# Patient Record
Sex: FEMALE | Race: WHITE | NOT HISPANIC OR LATINO | Employment: OTHER | URBAN - METROPOLITAN AREA
[De-identification: names, ages, dates, MRNs, and addresses within clinical notes are randomized per-mention and may not be internally consistent; named-entity substitution may affect disease eponyms.]

---

## 2017-01-12 ENCOUNTER — OFFICE VISIT (OUTPATIENT)
Dept: FAMILY MEDICINE CLINIC | Facility: CLINIC | Age: 67
End: 2017-01-12

## 2017-01-12 VITALS
HEIGHT: 62 IN | BODY MASS INDEX: 40.76 KG/M2 | HEART RATE: 63 BPM | DIASTOLIC BLOOD PRESSURE: 70 MMHG | SYSTOLIC BLOOD PRESSURE: 130 MMHG | TEMPERATURE: 98.3 F | WEIGHT: 221.5 LBS | OXYGEN SATURATION: 98 %

## 2017-01-12 DIAGNOSIS — J01.00 ACUTE MAXILLARY SINUSITIS, RECURRENCE NOT SPECIFIED: Primary | ICD-10-CM

## 2017-01-12 PROCEDURE — 99213 OFFICE O/P EST LOW 20 MIN: CPT | Performed by: FAMILY MEDICINE

## 2017-01-12 RX ORDER — AZITHROMYCIN 250 MG/1
TABLET, FILM COATED ORAL
Qty: 6 TABLET | Refills: 0 | Status: SHIPPED | OUTPATIENT
Start: 2017-01-12 | End: 2017-02-20

## 2017-01-12 RX ORDER — DEXTROMETHORPHAN HYDROBROMIDE AND PROMETHAZINE HYDROCHLORIDE 15; 6.25 MG/5ML; MG/5ML
5 SYRUP ORAL 4 TIMES DAILY PRN
Qty: 180 ML | Refills: 0 | Status: SHIPPED | OUTPATIENT
Start: 2017-01-12 | End: 2017-05-01

## 2017-01-12 NOTE — MR AVS SNAPSHOT
Melody Guzman   1/12/2017 4:00 PM   Office Visit    Dept Phone:  [phone]   Encounter #:  [id number]    Provider:  Ceci Estes MD   Department:  [facility] PRIMARY CARE                Your Full Care Plan              Today's Medication Changes          These changes are accurate as of: 1/12/17  3:55 PM.  If you have any questions, ask your nurse or doctor.               New Medication(s)Ordered:     azithromycin 250 MG tablet   Commonly known as:  ZITHROMAX Z-JONAS   Take 2 tablets the first day, then 1 tablet daily for 4 days.   Started by:  Ceci Estes MD       promethazine-dextromethorphan 6.25-15 MG/5ML syrup   Commonly known as:  PROMETHAZINE-DM   Take 5 mL by mouth 4 (Four) Times a Day As Needed for cough.   Started by:  Ceci Estes MD            Where to Get Your Medications      [facility] [address] - [phone]  - [phone] [address], [place] [address]     Phone:  [phone]     azithromycin 250 MG tablet    promethazine-dextromethorphan 6.25-15 MG/5ML syrup                  Your Updated Medication List          This list is accurate as of: 1/12/17  3:55 PM.  Always use your most recent med list.                atenolol 50 MG tablet   Commonly known as:  TENORMIN   Take 1 tablet by mouth daily.       atorvastatin 20 MG tablet   Commonly known as:  LIPITOR   Take 1 tablet by mouth daily.       azithromycin 250 MG tablet   Commonly known as:  ZITHROMAX Z-JONAS   Take 2 tablets the first day, then 1 tablet daily for 4 days.       hydrOXYzine 25 MG tablet   Commonly known as:  ATARAX   Take 1 tablet by mouth 3 (three) times a day as needed for itching.       indomethacin 50 MG capsule   Commonly known as:  INDOCIN   Take 1 capsule by mouth 2 (two) times a day with meals.       ketoconazole 2 % cream   Commonly known as:  NIZORAL   Apply   topically 2 (two) times a day.       meclizine 25 MG tablet   Commonly known as:  ANTIVERT   Take 1 tablet by mouth 3 (three) times a day as needed for dizziness.       promethazine-dextromethorphan 6.25-15 MG/5ML syrup   Commonly known as:  PROMETHAZINE-DM   Take 5 mL by mouth 4 (Four) Times a Day As Needed for cough.       triamcinolone 0.025 % cream   Commonly known as:  KENALOG   Apply  topically 3 (three) times a day.               You Were Diagnosed With        Codes Comments    Acute maxillary sinusitis, recurrence not specified    -  Primary ICD-10-CM: J01.00  ICD-9-CM: 461.0       Instructions     None    Patient Instructions History      Upcoming Appointments     Visit Type Date Time Department    OFFICE VISIT 2017  4:00 PM [facility] Signup     [facility] [employer] allows you to send messages to your doctor, view your test results, renew your prescriptions, schedule appointments, and more. To sign up, go to [employer] and click on the Sign Up Now link in the New User? box. Enter your [employer] Activation Code exactly as it appears below along with the last four digits of your Social Security Number and your Date of Birth () to complete the sign-up process. If you do not sign up before the expiration date, you must request a new code.    [employer] Activation Code: [email or web address]  Expires: 2017  3:55 PM    If you have questions, you can email [email or web address] or call [phone] to talk to our [employer] staff. Remember, [employer] is NOT to be used for urgent needs. For medical emergencies, dial 911.               Other Info from Your Visit           Your Appointments     2017  4:00 PM EST   Office Visit with Ceci Estes MD   [facility] (--)    [address]  [facility] [address] [phone]           Arrive 15 minutes prior to appointment.              Allergies     Levofloxacin    "   Nitrofurantoin      Sulfa Antibiotics      Sulfamethoxazole-trimethoprim      Trimox [Amoxicillin]  Parasthesia      Reason for Visit     URI           Vital Signs     Blood Pressure Pulse Temperature Height Weight Last Menstrual Period    130/70 63 98.3 °F (36.8 °C) 62\" (157.5 cm) 221 lb 8 oz (100 kg) (LMP Unknown)    Oxygen Saturation Body Mass Index Smoking Status             98% 40.51 kg/m2 Never Smoker         Problems and Diagnoses Noted     Acute maxillary sinusitis    -  Primary        "

## 2017-01-12 NOTE — PROGRESS NOTES
Subjective   Melody Guzman is a 66 y.o. female. CC: sinus    History of Present Illness  Melody is a 66 year old female who comes in today with the complaint of not feeling well since Tuesday.  She complains of coughing and sinus pressure.  She has nasal drainage which is brownish-green in color.  Her left ear also hurts.  She has been very tired.  She has tried Delsym for the cough but it has not helped her very much.      The following portions of the patient's history were reviewed and updated as appropriate: allergies, current medications, past medical history, past social history, past surgical history and problem list.    Review of Systems   Constitutional: Positive for fatigue. Negative for chills, diaphoresis and fever.   HENT: Positive for congestion, ear pain, rhinorrhea and sinus pressure.    Eyes: Negative.  Negative for visual disturbance.   Respiratory: Positive for cough. Negative for shortness of breath and wheezing.    Cardiovascular: Negative.  Negative for chest pain, palpitations and leg swelling.   Gastrointestinal: Negative.  Negative for abdominal pain, blood in stool, constipation, diarrhea, nausea and vomiting.   Genitourinary: Negative.  Negative for difficulty urinating.   Musculoskeletal: Negative.  Negative for arthralgias and back pain.   Skin: Negative for rash.   Neurological: Negative.  Negative for dizziness, facial asymmetry and headaches.   Psychiatric/Behavioral: Negative for dysphoric mood and sleep disturbance. The patient is not hyperactive.        Objective   Physical Exam   Constitutional: She appears well-developed and well-nourished.   HENT:   Head: Normocephalic and atraumatic.   Right Ear: Hearing, tympanic membrane, external ear and ear canal normal.   Left Ear: Hearing, tympanic membrane, external ear and ear canal normal.   Nose: Nose normal.   Mouth/Throat: Uvula is midline, oropharynx is clear and moist and mucous membranes are normal. No oropharyngeal exudate.    Neck: Normal range of motion. Neck supple.   Cardiovascular: Normal rate, regular rhythm and normal heart sounds.    No murmur heard.  Pulmonary/Chest: Effort normal and breath sounds normal. No respiratory distress.   Lymphadenopathy:     She has no cervical adenopathy.   Skin: Skin is warm and dry. No rash noted.   Psychiatric: She has a normal mood and affect. Her behavior is normal.   Nursing note and vitals reviewed.    Vitals:    01/12/17 1536   BP: 130/70   Pulse: 63   Temp: 98.3 °F (36.8 °C)   SpO2: 98%     Assessment/Plan   Problems Addressed this Visit     None      Visit Diagnoses     Acute maxillary sinusitis, recurrence not specified    -  Primary    Relevant Medications    azithromycin (ZITHROMAX Z-JONAS) 250 MG tablet    promethazine-dextromethorphan (PROMETHAZINE-DM) 6.25-15 MG/5ML syrup

## 2017-02-20 ENCOUNTER — OFFICE VISIT (OUTPATIENT)
Dept: FAMILY MEDICINE CLINIC | Facility: CLINIC | Age: 67
End: 2017-02-20

## 2017-02-20 VITALS
HEART RATE: 58 BPM | DIASTOLIC BLOOD PRESSURE: 65 MMHG | OXYGEN SATURATION: 98 % | TEMPERATURE: 97.9 F | WEIGHT: 218.8 LBS | BODY MASS INDEX: 40.27 KG/M2 | HEIGHT: 62 IN | SYSTOLIC BLOOD PRESSURE: 120 MMHG

## 2017-02-20 DIAGNOSIS — R19.7 DIARRHEA, UNSPECIFIED TYPE: Primary | ICD-10-CM

## 2017-02-20 DIAGNOSIS — R10.84 GENERALIZED ABDOMINAL PAIN: ICD-10-CM

## 2017-02-20 PROCEDURE — 99213 OFFICE O/P EST LOW 20 MIN: CPT | Performed by: FAMILY MEDICINE

## 2017-02-20 NOTE — PROGRESS NOTES
Subjective   Melody Guzman is a 66 y.o. female. CC: abdominal pain    History of Present Illness  Melody is a 66 year old female who comes in today because of abdominal pain which is primarily in the epigastric area.  She also has diarrhea.  She states that in January she took a Z pack for URI.  9 days later her dermatologist gave her Doxycycline 100 mg a day for 5 days.  One week ago she bought a layered salad at [employer].  She took about 3 bites of it and noticed that it tasted strange so she stopped eating it.  Since last Thursday she has felt bloated and has had watery diarrhea.  No blood in it.  Has multiple episodes a day.  Has had some nausea but no vomiting.  Has been afraid to eat because she doesn't want to make it any worse.  Has pain in epigastric area as well as the left side of her abdomen.  No problems on the right side.  No known fever.  No  symptoms.  Has taken Pepto Bismol and it did help a little.      The following portions of the patient's history were reviewed and updated as appropriate: allergies, current medications, past medical history, past social history, past surgical history and problem list.    Review of Systems   Constitutional: Negative for chills, diaphoresis, fatigue and fever.   HENT: Negative.  Negative for congestion.    Respiratory: Negative.  Negative for cough, shortness of breath and wheezing.    Cardiovascular: Negative.  Negative for chest pain, palpitations and leg swelling.   Gastrointestinal: Positive for abdominal distention, abdominal pain, diarrhea and nausea. Negative for blood in stool, constipation, rectal pain and vomiting.   Genitourinary: Negative.  Negative for difficulty urinating, dysuria and hematuria.   Musculoskeletal: Negative.  Negative for arthralgias and back pain.   Skin: Negative.  Negative for rash.   Neurological: Negative.  Negative for dizziness, light-headedness and headaches.   Psychiatric/Behavioral: Negative.  Negative for dysphoric mood  and sleep disturbance. The patient is not nervous/anxious.        Objective   Physical Exam   Constitutional: She is oriented to person, place, and time. She appears well-developed and well-nourished.   Neck: Normal range of motion. Neck supple.   Cardiovascular: Normal rate, regular rhythm and normal heart sounds.    No murmur heard.  Pulmonary/Chest: Effort normal and breath sounds normal. No respiratory distress. She has no wheezes. She has no rales.   Abdominal: Soft. She exhibits no distension and no mass. There is tenderness (epigastric and left side of the abdomen). No hernia.   Neurological: She is alert and oriented to person, place, and time.   Skin: Skin is warm and dry. No rash noted.   Psychiatric: She has a normal mood and affect. Her behavior is normal.   Nursing note and vitals reviewed.    Vitals:    02/20/17 1534   BP: 120/65   Pulse: 58   Temp: 97.9 °F (36.6 °C)   SpO2: 98%     Assessment/Plan   Problems Addressed this Visit     None      Visit Diagnoses     Diarrhea, unspecified type    -  Primary    Relevant Orders    Stool Culture    Clostridium Difficile Toxin    Generalized abdominal pain        Relevant Orders    Comprehensive metabolic panel    CBC & Differential

## 2017-02-21 ENCOUNTER — CLINICAL SUPPORT (OUTPATIENT)
Dept: FAMILY MEDICINE CLINIC | Facility: CLINIC | Age: 67
End: 2017-02-21

## 2017-02-21 DIAGNOSIS — R19.7 DIARRHEA, UNSPECIFIED TYPE: Primary | ICD-10-CM

## 2017-02-21 DIAGNOSIS — R19.7 DIARRHEA, UNSPECIFIED TYPE: ICD-10-CM

## 2017-02-21 LAB
ALBUMIN SERPL-MCNC: 4.7 G/DL (ref 3.5–5.2)
ALBUMIN/GLOB SERPL: 1.7 G/DL
ALP SERPL-CCNC: 61 U/L (ref 39–117)
ALT SERPL-CCNC: 13 U/L (ref 1–33)
AST SERPL-CCNC: 21 U/L (ref 1–32)
BASOPHILS # BLD AUTO: 0.02 10*3/MM3 (ref 0–0.2)
BASOPHILS NFR BLD AUTO: 0.2 % (ref 0–1.5)
BILIRUB SERPL-MCNC: 0.5 MG/DL (ref 0.1–1.2)
BUN SERPL-MCNC: 12 MG/DL (ref 8–23)
BUN/CREAT SERPL: 13.6 (ref 7–25)
CALCIUM SERPL-MCNC: 9.6 MG/DL (ref 8.6–10.5)
CHLORIDE SERPL-SCNC: 100 MMOL/L (ref 98–107)
CO2 SERPL-SCNC: 25.4 MMOL/L (ref 22–29)
CREAT SERPL-MCNC: 0.88 MG/DL (ref 0.57–1)
EOSINOPHIL # BLD AUTO: 0.15 10*3/MM3 (ref 0–0.7)
EOSINOPHIL NFR BLD AUTO: 1.7 % (ref 0.3–6.2)
ERYTHROCYTE [DISTWIDTH] IN BLOOD BY AUTOMATED COUNT: 13 % (ref 11.7–13)
EXPIRATION DATE: NORMAL
GASTROCULT GAST QL: NEGATIVE
GLOBULIN SER CALC-MCNC: 2.8 GM/DL
GLUCOSE SERPL-MCNC: 99 MG/DL (ref 65–99)
HCT VFR BLD AUTO: 41.1 % (ref 35.6–45.5)
HGB BLD-MCNC: 13.7 G/DL (ref 11.9–15.5)
IMM GRANULOCYTES # BLD: 0 10*3/MM3 (ref 0–0.03)
IMM GRANULOCYTES NFR BLD: 0 % (ref 0–0.5)
LYMPHOCYTES # BLD AUTO: 3.28 10*3/MM3 (ref 0.9–4.8)
LYMPHOCYTES NFR BLD AUTO: 38 % (ref 19.6–45.3)
Lab: NORMAL
MCH RBC QN AUTO: 28.6 PG (ref 26.9–32)
MCHC RBC AUTO-ENTMCNC: 33.3 G/DL (ref 32.4–36.3)
MCV RBC AUTO: 85.8 FL (ref 80.5–98.2)
MONOCYTES # BLD AUTO: 0.56 10*3/MM3 (ref 0.2–1.2)
MONOCYTES NFR BLD AUTO: 6.5 % (ref 5–12)
NEUTROPHILS # BLD AUTO: 4.62 10*3/MM3 (ref 1.9–8.1)
NEUTROPHILS NFR BLD AUTO: 53.6 % (ref 42.7–76)
PLATELET # BLD AUTO: 276 10*3/MM3 (ref 140–500)
POTASSIUM SERPL-SCNC: 4.4 MMOL/L (ref 3.5–5.2)
PROT SERPL-MCNC: 7.5 G/DL (ref 6–8.5)
RBC # BLD AUTO: 4.79 10*6/MM3 (ref 3.9–5.2)
SODIUM SERPL-SCNC: 140 MMOL/L (ref 136–145)
WBC # BLD AUTO: 8.63 10*3/MM3 (ref 4.5–10.7)

## 2017-02-21 PROCEDURE — 82274 ASSAY TEST FOR BLOOD FECAL: CPT | Performed by: FAMILY MEDICINE

## 2017-02-22 LAB — C DIFF TOX GENS STL QL NAA+PROBE: NEGATIVE

## 2017-02-25 LAB
BACTERIA SPEC CULT: NORMAL
BACTERIA SPEC CULT: NORMAL
CAMPYLOBACTER STL CULT: NORMAL
E COLI SXT STL QL IA: NEGATIVE
SALM + SHIG STL CULT: NORMAL

## 2017-04-14 ENCOUNTER — TELEPHONE (OUTPATIENT)
Dept: FAMILY MEDICINE CLINIC | Facility: CLINIC | Age: 67
End: 2017-04-14

## 2017-04-14 NOTE — TELEPHONE ENCOUNTER
Unfortunately can see her on Tuesday only if there is a cancellation.  Can try gynecologist or [facility]

## 2017-05-01 ENCOUNTER — OFFICE VISIT (OUTPATIENT)
Dept: FAMILY MEDICINE CLINIC | Facility: CLINIC | Age: 67
End: 2017-05-01

## 2017-05-01 VITALS
SYSTOLIC BLOOD PRESSURE: 130 MMHG | OXYGEN SATURATION: 96 % | DIASTOLIC BLOOD PRESSURE: 80 MMHG | WEIGHT: 221 LBS | TEMPERATURE: 97.9 F | HEIGHT: 62 IN | HEART RATE: 57 BPM | BODY MASS INDEX: 40.67 KG/M2

## 2017-05-01 DIAGNOSIS — N89.8 VAGINAL DISCHARGE: Primary | ICD-10-CM

## 2017-05-01 DIAGNOSIS — N89.8 VAGINAL IRRITATION: ICD-10-CM

## 2017-05-01 DIAGNOSIS — N34.1 NONSPECIFIC URETHRITIS: ICD-10-CM

## 2017-05-01 PROCEDURE — 99213 OFFICE O/P EST LOW 20 MIN: CPT | Performed by: FAMILY MEDICINE

## 2017-05-03 LAB
A VAGINAE DNA VAG QL NAA+PROBE: NORMAL SCORE
BVAB2 DNA VAG QL NAA+PROBE: NORMAL SCORE
C ALBICANS DNA VAG QL NAA+PROBE: NEGATIVE
C GLABRATA DNA VAG QL NAA+PROBE: NEGATIVE
MEGA1 DNA VAG QL NAA+PROBE: NORMAL SCORE
T VAGINALIS RRNA SPEC QL NAA+PROBE: NEGATIVE

## 2017-05-23 DIAGNOSIS — I10 ESSENTIAL HYPERTENSION: ICD-10-CM

## 2017-05-23 RX ORDER — ATENOLOL 50 MG/1
TABLET ORAL
Qty: 30 TABLET | Refills: 5 | Status: SHIPPED | OUTPATIENT
Start: 2017-05-23 | End: 2017-11-28 | Stop reason: SDUPTHER

## 2017-09-08 ENCOUNTER — TRANSCRIBE ORDERS (OUTPATIENT)
Dept: ADMINISTRATIVE | Facility: HOSPITAL | Age: 67
End: 2017-09-08

## 2017-09-08 DIAGNOSIS — Z12.31 VISIT FOR SCREENING MAMMOGRAM: Primary | ICD-10-CM

## 2017-09-13 ENCOUNTER — TRANSCRIBE ORDERS (OUTPATIENT)
Dept: ADMINISTRATIVE | Facility: HOSPITAL | Age: 67
End: 2017-09-13

## 2017-09-13 DIAGNOSIS — Z12.31 VISIT FOR SCREENING MAMMOGRAM: Primary | ICD-10-CM

## 2017-09-14 ENCOUNTER — OFFICE VISIT (OUTPATIENT)
Dept: FAMILY MEDICINE CLINIC | Facility: CLINIC | Age: 67
End: 2017-09-14

## 2017-09-14 VITALS
WEIGHT: 218.6 LBS | TEMPERATURE: 98 F | OXYGEN SATURATION: 97 % | DIASTOLIC BLOOD PRESSURE: 80 MMHG | HEART RATE: 56 BPM | BODY MASS INDEX: 40.23 KG/M2 | HEIGHT: 62 IN | SYSTOLIC BLOOD PRESSURE: 122 MMHG

## 2017-09-14 DIAGNOSIS — B37.31 YEAST VAGINITIS: Primary | ICD-10-CM

## 2017-09-14 DIAGNOSIS — L30.8 OTHER ECZEMA: ICD-10-CM

## 2017-09-14 DIAGNOSIS — R42 DIZZINESS: ICD-10-CM

## 2017-09-14 DIAGNOSIS — I10 ESSENTIAL HYPERTENSION: ICD-10-CM

## 2017-09-14 DIAGNOSIS — B37.2 YEAST DERMATITIS: ICD-10-CM

## 2017-09-14 PROCEDURE — 99213 OFFICE O/P EST LOW 20 MIN: CPT | Performed by: FAMILY MEDICINE

## 2017-09-14 RX ORDER — TRIAMCINOLONE ACETONIDE 0.25 MG/G
CREAM TOPICAL 3 TIMES DAILY
Qty: 30 G | Refills: 11 | Status: SHIPPED | OUTPATIENT
Start: 2017-09-14 | End: 2018-10-15 | Stop reason: SDUPTHER

## 2017-09-14 RX ORDER — FLUCONAZOLE 100 MG/1
TABLET ORAL
Qty: 10 TABLET | Refills: 5 | Status: SHIPPED | OUTPATIENT
Start: 2017-09-14 | End: 2018-03-15

## 2017-09-14 RX ORDER — KETOCONAZOLE 20 MG/G
CREAM TOPICAL 2 TIMES DAILY
Qty: 30 G | Refills: 11 | Status: SHIPPED | OUTPATIENT
Start: 2017-09-14 | End: 2018-10-15 | Stop reason: SDUPTHER

## 2017-09-14 RX ORDER — MECLIZINE HYDROCHLORIDE 25 MG/1
25 TABLET ORAL 3 TIMES DAILY PRN
Qty: 60 TABLET | Refills: 5 | Status: SHIPPED | OUTPATIENT
Start: 2017-09-14 | End: 2019-03-06 | Stop reason: SDUPTHER

## 2017-09-14 NOTE — PROGRESS NOTES
Subjective   Melody Guzman is a 67 y.o. female. CC: hypertension    History of Present Illness   Melody is a 67 year old female who comes in today for her blood pressure check.  She states that her blood pressure has been good.  She feels fine.  No headaches or dizziness, no chest pain.  She complains of a vaginal yeast infection with itching and discharge.  Requests Diflucan for this.  Also needs refills of 2 creams that she uses for dermatologic problems.  She has intermittent bouts of dizziness and requests a refill of Meclizine.        The following portions of the patient's history were reviewed and updated as appropriate: allergies, current medications, past medical history, past social history, past surgical history and problem list.    Review of Systems   Constitutional: Negative.  Negative for fatigue and unexpected weight change.   HENT: Negative.  Negative for congestion and ear pain.    Respiratory: Negative.  Negative for cough, shortness of breath and wheezing.    Cardiovascular: Negative.  Negative for chest pain, palpitations and leg swelling.   Gastrointestinal: Negative.  Negative for abdominal pain, blood in stool, constipation, diarrhea and nausea.   Genitourinary: Positive for vaginal discharge. Negative for difficulty urinating.        Vaginal itching   Musculoskeletal: Negative.  Negative for arthralgias and back pain.   Skin: Negative.  Negative for rash.   Neurological: Negative.  Negative for dizziness, light-headedness and headaches.   Psychiatric/Behavioral: Negative.  Negative for dysphoric mood and sleep disturbance. The patient is not nervous/anxious.        Objective   Physical Exam   Constitutional: She is oriented to person, place, and time. She appears well-developed and well-nourished.   Neck: Normal range of motion. Neck supple.   Cardiovascular: Normal rate, regular rhythm and normal heart sounds.    No murmur heard.  Pulmonary/Chest: Effort normal and breath sounds normal.  No respiratory distress.   Neurological: She is alert and oriented to person, place, and time.   Skin: Skin is warm and dry. No rash noted.   Psychiatric: She has a normal mood and affect. Her behavior is normal.   Nursing note and vitals reviewed.    Vitals:    09/14/17 1053   BP: 122/80   Pulse: 56   Temp: 98 °F (36.7 °C)   SpO2: 97%     Assessment/Plan   Problems Addressed this Visit        Cardiovascular and Mediastinum    Hypertension      Other Visit Diagnoses     Yeast vaginitis    -  Primary    Relevant Medications    fluconazole (DIFLUCAN) 100 MG tablet    Dizziness        Relevant Medications    meclizine (ANTIVERT) 25 MG tablet    Other eczema        Relevant Medications    triamcinolone (KENALOG) 0.025 % cream    ketoconazole (NIZORAL) 2 % cream    Yeast dermatitis        Relevant Medications    triamcinolone (KENALOG) 0.025 % cream    ketoconazole (NIZORAL) 2 % cream    fluconazole (DIFLUCAN) 100 MG tablet        Flu shots not available at the office as yet.

## 2017-09-26 ENCOUNTER — FLU SHOT (OUTPATIENT)
Dept: FAMILY MEDICINE CLINIC | Facility: CLINIC | Age: 67
End: 2017-09-26

## 2017-09-26 PROCEDURE — 90686 IIV4 VACC NO PRSV 0.5 ML IM: CPT | Performed by: FAMILY MEDICINE

## 2017-09-26 PROCEDURE — G0008 ADMIN INFLUENZA VIRUS VAC: HCPCS | Performed by: FAMILY MEDICINE

## 2017-10-06 ENCOUNTER — TELEPHONE (OUTPATIENT)
Dept: FAMILY MEDICINE CLINIC | Facility: CLINIC | Age: 67
End: 2017-10-06

## 2017-10-06 DIAGNOSIS — N30.90 BLADDER INFECTION: Primary | ICD-10-CM

## 2017-10-06 RX ORDER — CIPROFLOXACIN 500 MG/1
500 TABLET, FILM COATED ORAL 2 TIMES DAILY
Qty: 20 TABLET | Refills: 0 | Status: SHIPPED | OUTPATIENT
Start: 2017-10-06 | End: 2017-11-16

## 2017-10-06 NOTE — TELEPHONE ENCOUNTER
Pt called and said that she has a bladder infection. She has urine frequency and lower back pain. She has had a lot of these and knows the symptoms and was wondering if we can call something in?

## 2017-10-06 NOTE — TELEPHONE ENCOUNTER
Looks like she is allergic to all antibiotics normally used for UTI  Please call her---what can she take?  Zpack won't help   What happened when she took Trimox?

## 2017-10-20 ENCOUNTER — HOSPITAL ENCOUNTER (OUTPATIENT)
Dept: MAMMOGRAPHY | Facility: HOSPITAL | Age: 67
Discharge: HOME | End: 2017-10-20
Admitting: FAMILY MEDICINE

## 2017-10-20 DIAGNOSIS — Z12.31 VISIT FOR SCREENING MAMMOGRAM: ICD-10-CM

## 2017-10-20 PROCEDURE — G0202 SCR MAMMO BI INCL CAD: HCPCS

## 2017-11-16 ENCOUNTER — OFFICE VISIT (OUTPATIENT)
Dept: FAMILY MEDICINE CLINIC | Facility: CLINIC | Age: 67
End: 2017-11-16

## 2017-11-16 VITALS
HEIGHT: 62 IN | BODY MASS INDEX: 39.8 KG/M2 | OXYGEN SATURATION: 99 % | TEMPERATURE: 97.9 F | DIASTOLIC BLOOD PRESSURE: 60 MMHG | WEIGHT: 216.3 LBS | SYSTOLIC BLOOD PRESSURE: 118 MMHG | HEART RATE: 89 BPM

## 2017-11-16 DIAGNOSIS — N76.0 ACUTE VAGINITIS: Primary | ICD-10-CM

## 2017-11-16 DIAGNOSIS — R21 RASH OF GENITAL AREA: ICD-10-CM

## 2017-11-16 PROCEDURE — 99213 OFFICE O/P EST LOW 20 MIN: CPT | Performed by: FAMILY MEDICINE

## 2017-11-16 NOTE — PROGRESS NOTES
Subjective   Melody Guzman is a 67 y.o. female. CC: vaginitis    History of Present Illness   Melody is a 67 year old female who comes in today for a possible vaginal infection.  She states that this started several days ago.  She has noted a rash in the genital and labial areas.  Also on her buttocks.  She does have a slight vaginal discharge.  There is no odor to it.  Just notices a small amount on her underwear and the toilet paper.  She has used A & D ointment on the irritated external areas.        The following portions of the patient's history were reviewed and updated as appropriate: allergies, current medications, past family history, past medical history, past social history, past surgical history and problem list.    Review of Systems   Constitutional: Negative.  Negative for fatigue and unexpected weight change.   HENT: Negative.  Negative for congestion.    Respiratory: Negative.  Negative for cough, shortness of breath and wheezing.    Cardiovascular: Negative.  Negative for chest pain and leg swelling.   Gastrointestinal: Negative.  Negative for abdominal pain, diarrhea and nausea.   Genitourinary: Positive for vaginal discharge. Negative for difficulty urinating and hematuria.   Musculoskeletal: Negative.  Negative for arthralgias and back pain.   Skin: Negative.  Negative for rash.   Neurological: Negative.  Negative for dizziness, light-headedness and headaches.   Psychiatric/Behavioral: Negative.  Negative for dysphoric mood and sleep disturbance. The patient is not nervous/anxious.        Objective   Physical Exam   Constitutional: She is oriented to person, place, and time. She appears well-developed and well-nourished.   Genitourinary: Vaginal discharge (white, thin discharge) found.   Genitourinary Comments: Redness inside the labia and the vaginal mucosa   Neurological: She is alert and oriented to person, place, and time.   Skin: Skin is warm and dry. Rash (erythematous rash in the groin  "bilaterally.  also in the rectal area) noted.   Psychiatric: She has a normal mood and affect. Her behavior is normal.   Nursing note and vitals reviewed.    Vitals:    11/16/17 1420   BP: 118/60   Pulse: 89   Temp: 97.9 °F (36.6 °C)   SpO2: 99%   Weight: 216 lb 4.8 oz (98.1 kg)   Height: 62\" (157.5 cm)     Assessment/Plan   Problems Addressed this Visit     None      Visit Diagnoses     Acute vaginitis    -  Primary    Relevant Orders    NuSwab Vaginitis (VG) - Swab, Vagina    Rash of genital area                     "

## 2017-11-28 DIAGNOSIS — I10 ESSENTIAL HYPERTENSION: ICD-10-CM

## 2017-11-29 RX ORDER — ATENOLOL 50 MG/1
TABLET ORAL
Qty: 30 TABLET | Refills: 0 | Status: SHIPPED | OUTPATIENT
Start: 2017-11-29 | End: 2017-12-05 | Stop reason: SDUPTHER

## 2017-12-01 ENCOUNTER — TELEPHONE (OUTPATIENT)
Dept: FAMILY MEDICINE CLINIC | Facility: CLINIC | Age: 67
End: 2017-12-01

## 2017-12-01 NOTE — TELEPHONE ENCOUNTER
GUNNER HERNANDEZ WANTS TO BE SEEN BY YOU FOR HER BLOOD PRESSURE. CAN YOU WORK HER IN? [phone] OR [phone] CELL LEAVE MESSAGE. THANK YOU!

## 2017-12-03 NOTE — TELEPHONE ENCOUNTER
See previous message.  Please call her and see if this is for routine blood pressure check or is she having problems?

## 2017-12-05 ENCOUNTER — OFFICE VISIT (OUTPATIENT)
Dept: FAMILY MEDICINE CLINIC | Facility: CLINIC | Age: 67
End: 2017-12-05

## 2017-12-05 VITALS
SYSTOLIC BLOOD PRESSURE: 130 MMHG | DIASTOLIC BLOOD PRESSURE: 80 MMHG | HEART RATE: 60 BPM | TEMPERATURE: 98.2 F | BODY MASS INDEX: 39.47 KG/M2 | OXYGEN SATURATION: 98 % | WEIGHT: 214.5 LBS | HEIGHT: 62 IN

## 2017-12-05 DIAGNOSIS — R53.81 MALAISE AND FATIGUE: ICD-10-CM

## 2017-12-05 DIAGNOSIS — R53.83 MALAISE AND FATIGUE: ICD-10-CM

## 2017-12-05 DIAGNOSIS — I10 ESSENTIAL HYPERTENSION: ICD-10-CM

## 2017-12-05 DIAGNOSIS — J30.89 CHRONIC NON-SEASONAL ALLERGIC RHINITIS, UNSPECIFIED TRIGGER: ICD-10-CM

## 2017-12-05 DIAGNOSIS — R07.9 CHEST PAIN, UNSPECIFIED TYPE: Primary | ICD-10-CM

## 2017-12-05 LAB
EXPIRATION DATE: NORMAL
FLUAV AG NPH QL: NORMAL
FLUBV AG NPH QL: NORMAL
INTERNAL CONTROL: NORMAL
Lab: NORMAL

## 2017-12-05 PROCEDURE — 87804 INFLUENZA ASSAY W/OPTIC: CPT | Performed by: FAMILY MEDICINE

## 2017-12-05 PROCEDURE — 99213 OFFICE O/P EST LOW 20 MIN: CPT | Performed by: FAMILY MEDICINE

## 2017-12-05 PROCEDURE — 93000 ELECTROCARDIOGRAM COMPLETE: CPT | Performed by: FAMILY MEDICINE

## 2017-12-05 RX ORDER — ATENOLOL 50 MG/1
50 TABLET ORAL DAILY
Qty: 30 TABLET | Refills: 11 | Status: SHIPPED | OUTPATIENT
Start: 2017-12-05 | End: 2018-12-27 | Stop reason: SDUPTHER

## 2017-12-05 RX ORDER — OLOPATADINE HYDROCHLORIDE 1 MG/ML
1 SOLUTION/ DROPS OPHTHALMIC 2 TIMES DAILY
Qty: 1 EACH | Refills: 12 | Status: SHIPPED | OUTPATIENT
Start: 2017-12-05 | End: 2019-03-06 | Stop reason: SDUPTHER

## 2017-12-05 NOTE — PROGRESS NOTES
Procedure     ECG 12 Lead  Date/Time: 12/5/2017 2:25 PM  Performed by: ЕКАТЕРИНА MOSES  Authorized by: ЕКАТЕРИНА MOSES   Comparison: not compared with previous ECG   Previous ECG: no previous ECG available  Rhythm: sinus bradycardia  Rate: bradycardic  BPM: 54  Conduction: conduction normal  ST Segments: ST segments normal  T Waves: T waves normal  QRS axis: normal  Other: no other findings  Clinical impression: normal ECG  Comments: EKG done because of chest heaviness

## 2017-12-05 NOTE — PROGRESS NOTES
Subjective   Melody Guzman is a 67 y.o. female. CC: fatigue    History of Present Illness   Melody is a 67 year old female who comes in today because she hasn't felt well for about a week.  She states that she felt bad all last week.  Her chest felt heavy and her heart seemed to be fluttering at times.  The heaviness was fairly constant as opposed to coming and going.  She had a lot of head congestion and nasal drainage.  Her ears have bothered her.  She has had to clear her throat frequently because of the postnasal drainage.  Her eyes have been itching.  At one time she used Patanol and it really helped a lot.  Nasal drainage was bloody a few times last week.  She does feel better today.  She was concerned that her blood pressure may be elevated.  She has also ached all over and was concerned that she might have the flu.        The following portions of the patient's history were reviewed and updated as appropriate: allergies, current medications, past medical history, past social history, past surgical history and problem list.    Review of Systems   Constitutional: Positive for fatigue. Negative for chills, diaphoresis and fever.   HENT: Positive for congestion, postnasal drip and rhinorrhea.    Eyes: Negative.  Negative for visual disturbance.   Respiratory: Negative.  Negative for cough, shortness of breath and wheezing.    Cardiovascular: Negative for palpitations and leg swelling.   Gastrointestinal: Negative.  Negative for abdominal pain, blood in stool, constipation, diarrhea and nausea.   Genitourinary: Negative.  Negative for difficulty urinating.   Musculoskeletal: Negative.  Negative for arthralgias and back pain.   Skin: Negative.  Negative for rash.   Neurological: Positive for headaches.   Psychiatric/Behavioral: Negative.  Negative for dysphoric mood and sleep disturbance. The patient is not nervous/anxious.        Objective   Physical Exam   Constitutional: She is oriented to person, place, and  "time. She appears well-developed and well-nourished.   HENT:   Head: Normocephalic and atraumatic.   Right Ear: Hearing, tympanic membrane, external ear and ear canal normal.   Left Ear: Hearing, tympanic membrane, external ear and ear canal normal.   Nose: Nose normal.   Mouth/Throat: Uvula is midline, oropharynx is clear and moist and mucous membranes are normal. No oropharyngeal exudate.   Neck: Normal range of motion. Neck supple.   Cardiovascular: Normal rate, regular rhythm and normal heart sounds.    Pulmonary/Chest: Effort normal and breath sounds normal. She exhibits no tenderness.   Neurological: She is alert and oriented to person, place, and time.   Skin: Skin is warm and dry. No rash noted.   Psychiatric: She has a normal mood and affect. Her behavior is normal.   Nursing note and vitals reviewed.    Vitals:    12/05/17 1401   BP: 130/80   Pulse: 60   Temp: 98.2 °F (36.8 °C)   SpO2: 98%   Weight: 97.3 kg (214 lb 8 oz)   Height: 157.5 cm (62.01\")     Assessment/Plan   Problems Addressed this Visit        Cardiovascular and Mediastinum    Hypertension    Relevant Medications    atenolol (TENORMIN) 50 MG tablet       Respiratory    Atopic rhinitis    Relevant Medications    olopatadine (PATANOL) 0.1 % ophthalmic solution      Other Visit Diagnoses     Chest pain, unspecified type    -  Primary    Relevant Orders    ECG 12 Lead    Malaise and fatigue        Relevant Orders    POC Influenza A / B (Completed)      Flu test negative.  Most likely has a viral infection.  Symptomatic treatment.    EKG is normal.           "

## 2018-03-15 ENCOUNTER — OFFICE VISIT (OUTPATIENT)
Dept: FAMILY MEDICINE CLINIC | Facility: CLINIC | Age: 68
End: 2018-03-15

## 2018-03-15 VITALS
HEIGHT: 62 IN | WEIGHT: 215.5 LBS | BODY MASS INDEX: 39.66 KG/M2 | OXYGEN SATURATION: 98 % | TEMPERATURE: 97.5 F | HEART RATE: 54 BPM | DIASTOLIC BLOOD PRESSURE: 80 MMHG | SYSTOLIC BLOOD PRESSURE: 140 MMHG

## 2018-03-15 DIAGNOSIS — N30.00 ACUTE CYSTITIS WITHOUT HEMATURIA: ICD-10-CM

## 2018-03-15 DIAGNOSIS — J01.10 ACUTE NON-RECURRENT FRONTAL SINUSITIS: ICD-10-CM

## 2018-03-15 DIAGNOSIS — R35.0 URINE FREQUENCY: Primary | ICD-10-CM

## 2018-03-15 LAB
BILIRUB BLD-MCNC: NEGATIVE MG/DL
CLARITY, POC: CLEAR
COLOR UR: YELLOW
GLUCOSE UR STRIP-MCNC: NEGATIVE MG/DL
KETONES UR QL: NEGATIVE
LEUKOCYTE EST, POC: ABNORMAL
NITRITE UR-MCNC: NEGATIVE MG/ML
PH UR: 5.5 [PH] (ref 5–8)
PROT UR STRIP-MCNC: NEGATIVE MG/DL
RBC # UR STRIP: NEGATIVE /UL
SP GR UR: 1.01 (ref 1–1.03)
UROBILINOGEN UR QL: NORMAL

## 2018-03-15 PROCEDURE — 99213 OFFICE O/P EST LOW 20 MIN: CPT | Performed by: NURSE PRACTITIONER

## 2018-03-15 PROCEDURE — 81003 URINALYSIS AUTO W/O SCOPE: CPT | Performed by: NURSE PRACTITIONER

## 2018-03-15 RX ORDER — CEFACLOR 500 MG
500 CAPSULE ORAL 3 TIMES DAILY
Qty: 21 CAPSULE | Refills: 0 | Status: SHIPPED | OUTPATIENT
Start: 2018-03-15 | End: 2018-03-30

## 2018-03-15 RX ORDER — FLUCONAZOLE 150 MG/1
150 TABLET ORAL EVERY OTHER DAY
Qty: 10 TABLET | Refills: 0 | Status: SHIPPED | OUTPATIENT
Start: 2018-03-15 | End: 2018-07-16

## 2018-03-15 NOTE — PATIENT INSTRUCTIONS
Cefaclor capsules  What is this medicine?  CEFACLOR (SEF a klor) is an cephalosporin antibiotic. It is used to treat certain kinds of bacterial infections. It will not work for colds, flu, or other viral infections.  This medicine may be used for other purposes; ask your health care provider or pharmacist if you have questions.  COMMON BRAND NAME(S): Ty  What should I tell my health care provider before I take this medicine?  They need to know if you have any of these conditions:  -bleeding problems  -diarrhea  -kidney disease  -stomach or intestine problems (especially colitis)  -an unusual or allergic reaction to cefaclor, other cephalosporin antibiotics, penicillin, penicillamine, other foods, dyes or preservatives  -pregnant or trying to get pregnant  -breast-feeding  How should I use this medicine?  Take this medicine by mouth. Follow the directions on the prescription label. You can take it with or without food. If it upsets your stomach it may help to take it with food. Take your medicine at regular intervals. Do not take it more often than directed. Take all of your medicine as directed even if you think you are better. Do not skip doses or stop your medicine early.  Talk to your pediatrician regarding the use of this medicine in children. While this drug may be prescribed for selected conditions, precautions do apply.  Overdosage: If you think you have taken too much of this medicine contact a poison control center or emergency room at once.  NOTE: This medicine is only for you. Do not share this medicine with others.  What if I miss a dose?  If you miss a dose, take it as soon as you can. If it is almost time for your next dose, take only that dose. Do not take double or extra doses. There should be an interval of at least 4 hours between doses.  What may interact with this medicine?  -other antibiotics  -probenecid  This list may not describe all possible interactions. Give your health care provider a  list of all the medicines, herbs, non-prescription drugs, or dietary supplements you use. Also tell them if you smoke, drink alcohol, or use illegal drugs. Some items may interact with your medicine.  What should I watch for while using this medicine?  Tell your doctor or health care professional if your symptoms do not get better in a few days.  If you are diabetic you may get a false-positive result for sugar in your urine. Check with your doctor or health care professional before you change your diet or the dose of your diabetes medicine.  What side effects may I notice from receiving this medicine?  Side effects that you should report to your doctor or health care professional as soon as possible:  -allergic reactions like skin rash, itching or hives, swelling of the face, lips, or tongue  -breathing problems  -dizziness  -fever or chills  -redness, blistering, peeling or loosening of the skin, including inside the mouth  -seizures  -severe or watery diarrhea  -stomach pain or cramps  -sore throat  -swollen joints  -trouble passing urine or change in the amount of urine  -unusual bleeding or bruising  -unusually weak or tired  Side effects that usually do not require medical attention (report to your doctor or health care professional if they continue or are bothersome):  -gas or heartburn  -headache  -nausea, vomiting  This list may not describe all possible side effects. Call your doctor for medical advice about side effects. You may report side effects to FDA at 3-261-FDA-8894.  Where should I keep my medicine?  Keep out of the reach of children.  Store at room temperature between 15 and 30 degrees C (59 and 86 degrees F). Throw the medicine away after the expiration date.  NOTE: This sheet is a summary. It may not cover all possible information. If you have questions about this medicine, talk to your doctor, pharmacist, or health care provider.  © 2018 [employer]/Gold Standard (2009-03-23 17:12:09)

## 2018-03-15 NOTE — PROGRESS NOTES
Subjective   Melody Guzman is a 67 y.o. female presents with intermittent pain and bilateral pressure around eyes and cheekbones. With left ear popping. Increased post nasal drainage. Symptoms have been ongoing for approximately one week.     Also with urinary frequency x 1 day. Denies dysuria, prone to UTI's. Lower abdominal     Also with red and itching in vaginal area.     Urinary Tract Infection    This is a new problem. The current episode started yesterday. The problem has been unchanged. The patient is experiencing no pain. There has been no fever. Associated symptoms include frequency and urgency. Pertinent negatives include no chills, discharge, flank pain, hematuria, hesitancy, nausea, possible pregnancy, sweats or vomiting. She has tried nothing for the symptoms. The treatment provided no relief. Her past medical history is significant for recurrent UTIs.   Sinusitis   This is a new problem. The current episode started in the past 7 days. The problem has been gradually worsening since onset. There has been no fever. Her pain is at a severity of 3/10. The pain is mild. Associated symptoms include congestion, ear pain (ears popping) and sinus pressure. Pertinent negatives include no chills, coughing, diaphoresis, headaches, hoarse voice, neck pain, shortness of breath, sneezing, sore throat or swollen glands. Past treatments include nothing. The treatment provided no relief.        The following portions of the patient's history were reviewed and updated as appropriate: allergies, current medications, past family history, past medical history, past social history, past surgical history and problem list.    Review of Systems   Constitutional: Negative for appetite change, chills, diaphoresis, fatigue and fever.   HENT: Positive for congestion, ear pain (ears popping), postnasal drip, rhinorrhea and sinus pressure. Negative for ear discharge, hoarse voice, sneezing and sore throat.    Eyes: Negative.     Respiratory: Negative for cough, shortness of breath and wheezing.    Cardiovascular: Negative.    Gastrointestinal: Negative for nausea and vomiting.        Abdominal pressure     Endocrine: Negative.    Genitourinary: Positive for frequency, urgency and vaginal discharge (mild white discharge). Negative for dysuria, flank pain, hematuria, hesitancy, vaginal bleeding and vaginal pain.   Musculoskeletal: Negative for arthralgias, back pain and neck pain.   Neurological: Negative for headaches.       Objective   Physical Exam   Constitutional: She is oriented to person, place, and time. She appears well-developed and well-nourished.   HENT:   Right Ear: Ear canal normal. A middle ear effusion is present.   Left Ear: Ear canal normal. A middle ear effusion is present.   Nose: Mucosal edema present. Right sinus exhibits frontal sinus tenderness. Right sinus exhibits no maxillary sinus tenderness. Left sinus exhibits frontal sinus tenderness. Left sinus exhibits no maxillary sinus tenderness.   Mouth/Throat: Uvula is midline, oropharynx is clear and moist and mucous membranes are normal.   Eyes: Pupils are equal, round, and reactive to light.   Neck: Neck supple. No thyromegaly present.   Cardiovascular: Normal rate, regular rhythm and normal heart sounds.  Exam reveals no gallop and no friction rub.    No murmur heard.  Pulmonary/Chest: Effort normal and breath sounds normal. No respiratory distress. She has no wheezes. She has no rales.   Abdominal: Soft. Bowel sounds are normal. There is tenderness.   Lymphadenopathy:     She has no cervical adenopathy.   Neurological: She is alert and oriented to person, place, and time.   Skin: Skin is warm and dry.   Psychiatric: She has a normal mood and affect.       Assessment/Plan   Melody was seen today for sinusitis, urine frequency and vaginitis.    Diagnoses and all orders for this visit:    Urine frequency  -     Urine Culture - Urine, Urine, Clean Catch  -     POCT  urinalysis dipstick, automated    Acute non-recurrent frontal sinusitis    Acute cystitis without hematuria    Other orders  -     fluconazole (DIFLUCAN) 150 MG tablet; Take 1 tablet by mouth Every Other Day.  -     cefaclor (CECLOR) 500 MG capsule; Take 1 capsule by mouth 3 (Three) Times a Day.      Patient reports tolerating cipro and ceflaclor despite allergies listed.   Will start with ceclor and await urine results.

## 2018-03-19 LAB
BACTERIA UR CULT: ABNORMAL
BACTERIA UR CULT: ABNORMAL
OTHER ANTIBIOTIC SUSC ISLT: ABNORMAL

## 2018-03-30 ENCOUNTER — OFFICE VISIT (OUTPATIENT)
Dept: FAMILY MEDICINE CLINIC | Facility: CLINIC | Age: 68
End: 2018-03-30

## 2018-03-30 VITALS
HEART RATE: 85 BPM | HEIGHT: 62 IN | BODY MASS INDEX: 39.56 KG/M2 | SYSTOLIC BLOOD PRESSURE: 142 MMHG | DIASTOLIC BLOOD PRESSURE: 78 MMHG | OXYGEN SATURATION: 98 % | WEIGHT: 215 LBS

## 2018-03-30 DIAGNOSIS — J30.2 CHRONIC SEASONAL ALLERGIC RHINITIS, UNSPECIFIED TRIGGER: ICD-10-CM

## 2018-03-30 DIAGNOSIS — R42 VERTIGO: ICD-10-CM

## 2018-03-30 DIAGNOSIS — Z12.11 ENCOUNTER FOR SCREENING COLONOSCOPY: ICD-10-CM

## 2018-03-30 DIAGNOSIS — E78.00 PURE HYPERCHOLESTEROLEMIA: ICD-10-CM

## 2018-03-30 DIAGNOSIS — I10 ESSENTIAL HYPERTENSION: Primary | ICD-10-CM

## 2018-03-30 PROBLEM — H10.13 ALLERGIC CONJUNCTIVITIS OF BOTH EYES: Status: ACTIVE | Noted: 2018-03-30

## 2018-03-30 PROCEDURE — 99214 OFFICE O/P EST MOD 30 MIN: CPT | Performed by: FAMILY MEDICINE

## 2018-03-30 NOTE — PROGRESS NOTES
"Subjective   Melody Guzman is a 67 y.o. female. Previously seen by Dr. Estes. Pt is new to me, problems are new to me.      Chief Complaint   Patient presents with   • Establish Care        History of Present Illness  She is feeling much better from her recently UTI and sinus infection.    HTN  Is taking atenolol for this. Long time. Overall has been well controlled.     Itching  Long standing hx of intermittent itching. Has had the problem since early 20s. Red streaks, has been using hydroxyzine with relief. Will use three times a day when she is having issues, takes usually about 2-3 days.     Vertigo  Hx of. First developed in 1998. She takes meclizine for this when it causes problems. Last occurred a few weeks ago, medication helps. Can tell when it is starting, odd sensation, eyes feel funny so usually takes care to get somewhere safe. Slows her movements and focuses on one point.     The following portions of the patient's history were reviewed and updated as appropriate: allergies, current medications, past family history, past medical history, past social history, past surgical history and problem list.    Review of Systems   Constitutional: Negative for activity change, appetite change and unexpected weight change.   Respiratory: Negative for shortness of breath.    Cardiovascular: Negative for chest pain and leg swelling.   Gastrointestinal: Negative for blood in stool, constipation and diarrhea.       Objective   Blood pressure 142/78, pulse 85, height 157.5 cm (62.01\"), weight 97.5 kg (215 lb), SpO2 98 %.  Physical Exam   Constitutional: She is oriented to person, place, and time. She appears well-nourished. No distress.   HENT:   Right Ear: External ear normal.   Left Ear: External ear normal.   Nose: Nose normal.   Mouth/Throat: Oropharynx is clear and moist.   Eyes: Conjunctivae are normal. Right eye exhibits no discharge. Left eye exhibits no discharge. No scleral icterus.   Neck: Neck supple. " No thyromegaly present.   Cardiovascular: Normal rate, regular rhythm, normal heart sounds and intact distal pulses.    No murmur heard.  Pulmonary/Chest: Effort normal and breath sounds normal. No respiratory distress. She has no wheezes.   Musculoskeletal: She exhibits no edema.   Lymphadenopathy:     She has no cervical adenopathy.   Neurological: She is alert and oriented to person, place, and time. She exhibits normal muscle tone.   Psychiatric: She has a normal mood and affect. Her behavior is normal.   Vitals reviewed.      Assessment/Plan   Melody was seen today for establish care.    Diagnoses and all orders for this visit:    Essential hypertension  Well controlled, started walking again x 1 week. Always better when she is walking.  Chronic seasonal allergic rhinitis, unspecified trigger  Well controlled, eye symptoms with drops, coming up on her worst season  Vertigo  Intermittent, pt controls with behavior and med  Pure hypercholesterolemia  Discussed rechecking and will do at next appt after she has been exercising.  Encounter for screening colonoscopy  -     Ambulatory Referral For Screening Colonoscopy        Immunization: pt is due for starting pneumonia vaccine series, will start at next visit in 3-4 months, prefers to postpone while she is recovering from URI.

## 2018-06-05 ENCOUNTER — ANESTHESIA EVENT (OUTPATIENT)
Dept: GASTROENTEROLOGY | Facility: HOSPITAL | Age: 68
End: 2018-06-05

## 2018-06-05 ENCOUNTER — ANESTHESIA (OUTPATIENT)
Dept: GASTROENTEROLOGY | Facility: HOSPITAL | Age: 68
End: 2018-06-05

## 2018-06-05 ENCOUNTER — HOSPITAL ENCOUNTER (OUTPATIENT)
Facility: HOSPITAL | Age: 68
Setting detail: HOSPITAL OUTPATIENT SURGERY
Discharge: HOME | End: 2018-06-05
Attending: SURGERY | Admitting: SURGERY

## 2018-06-05 VITALS
TEMPERATURE: 97.6 F | WEIGHT: 202 LBS | SYSTOLIC BLOOD PRESSURE: 189 MMHG | DIASTOLIC BLOOD PRESSURE: 93 MMHG | RESPIRATION RATE: 18 BRPM | BODY MASS INDEX: 36.93 KG/M2 | OXYGEN SATURATION: 100 % | HEART RATE: 50 BPM

## 2018-06-05 DIAGNOSIS — Z12.11 COLON CANCER SCREENING: ICD-10-CM

## 2018-06-05 PROCEDURE — 88305 TISSUE EXAM BY PATHOLOGIST: CPT | Performed by: SURGERY

## 2018-06-05 PROCEDURE — 25010000002 PROPOFOL 10 MG/ML EMULSION: Performed by: ANESTHESIOLOGY

## 2018-06-05 RX ORDER — LIDOCAINE HYDROCHLORIDE 20 MG/ML
INJECTION, SOLUTION INFILTRATION; PERINEURAL AS NEEDED
Status: DISCONTINUED | OUTPATIENT
Start: 2018-06-05 | End: 2018-06-05 | Stop reason: SURG

## 2018-06-05 RX ORDER — PROPOFOL 10 MG/ML
VIAL (ML) INTRAVENOUS AS NEEDED
Status: DISCONTINUED | OUTPATIENT
Start: 2018-06-05 | End: 2018-06-05 | Stop reason: SURG

## 2018-06-05 RX ORDER — SODIUM CHLORIDE, SODIUM LACTATE, POTASSIUM CHLORIDE, CALCIUM CHLORIDE 600; 310; 30; 20 MG/100ML; MG/100ML; MG/100ML; MG/100ML
30 INJECTION, SOLUTION INTRAVENOUS CONTINUOUS PRN
Status: DISCONTINUED | OUTPATIENT
Start: 2018-06-05 | End: 2018-06-05 | Stop reason: HOSPADM

## 2018-06-05 RX ORDER — PROPOFOL 10 MG/ML
VIAL (ML) INTRAVENOUS CONTINUOUS PRN
Status: DISCONTINUED | OUTPATIENT
Start: 2018-06-05 | End: 2018-06-05 | Stop reason: SURG

## 2018-06-05 RX ORDER — SODIUM CHLORIDE 0.9 % (FLUSH) 0.9 %
1-10 SYRINGE (ML) INJECTION AS NEEDED
Status: DISCONTINUED | OUTPATIENT
Start: 2018-06-05 | End: 2018-06-05 | Stop reason: HOSPADM

## 2018-06-05 RX ADMIN — LIDOCAINE HYDROCHLORIDE 60 MG: 20 INJECTION, SOLUTION INFILTRATION; PERINEURAL at 09:06

## 2018-06-05 RX ADMIN — SODIUM CHLORIDE, POTASSIUM CHLORIDE, SODIUM LACTATE AND CALCIUM CHLORIDE 30 ML/HR: 600; 310; 30; 20 INJECTION, SOLUTION INTRAVENOUS at 07:46

## 2018-06-05 RX ADMIN — PROPOFOL 100 MCG/KG/MIN: 10 INJECTION, EMULSION INTRAVENOUS at 09:06

## 2018-06-05 RX ADMIN — PROPOFOL 100 MG: 10 INJECTION, EMULSION INTRAVENOUS at 09:06

## 2018-06-05 NOTE — H&P
Date: 2018     Patient: Melody Guzman    : 1950   [id number]       History:   The patient is a 68 y.o. female of Keisha Carver MD  who presents for screening colonoscopy. The patient currently has no complaints. she  denies any history of nausea, abdominal pain, weight loss, change in bowel habits or rectal bleeding.   Family history is negative for for colon cancer or polyps.     The patient has had a colonoscopy in the past-  .      The following portions of the patient's history were reviewed and updated as appropriate: allergies, current medications, past family history, past medical history, past social history, past surgical history and problem list.    Past Medical History:   Diagnosis Date   • Hypertension    • Morbid (severe) obesity due to excess calories    • Pure hypercholesterolemia 3/30/2018   • Uterine cancer 2009   • Vertigo       Past Surgical History:   Procedure Laterality Date   • BREAST BIOPSY Left     had nipple retraction, fibroadenoma   • CERVICAL POLYPECTOMY     • CHOLECYSTECTOMY     • COLONOSCOPY     • HEMORRHOIDECTOMY     • HYSTERECTOMY       Medications:   Prescriptions Prior to Admission   Medication Sig Dispense Refill Last Dose   • atenolol (TENORMIN) 50 MG tablet Take 1 tablet (50 mg total) by mouth Daily 30 tablet 11 2018 at Unknown time   • fluconazole (DIFLUCAN) 150 MG tablet Take 1 tablet by mouth Every Other Day. 10 tablet 0 Past Month at Unknown time   • hydrOXYzine (ATARAX) 25 MG tablet Take 1 tablet by mouth 3 (three) times a day as needed for itching. 40 tablet 2 More than a month at Unknown time   • ketoconazole (NIZORAL) 2 % cream Apply  topically 2 (Two) Times a Day. 30 g 11 More than a month at Unknown time   • meclizine (ANTIVERT) 25 MG tablet Take 1 tablet by mouth 3 (Three) Times a Day As Needed for dizziness. 60 tablet 5 More than a month at Unknown time   • olopatadine (PATANOL) 0.1 % ophthalmic solution Administer 1 drop to both  eyes 2 (Two) Times a Day 1 each 12 More than a month at Unknown time   • triamcinolone (KENALOG) 0.025 % cream Apply  topically 3 (Three) Times a Day. 30 g 11 More than a month at Unknown time      Allergies: Trimox [amoxicillin]; Levofloxacin; Nitrofurantoin; Sulfa antibiotics; and Sulfamethoxazole-trimethoprim   Social History:  Social History     Social History   • Marital status: Single     Occupational History   • retired, [employer]      Social History Main Topics   • Smoking status: Never Smoker   • Smokeless tobacco: Never Used   • Alcohol use No   • Drug use: No   • Sexual activity: Defer     Other Topics Concern   • Not on file        Family History   Problem Relation Age of Onset   • Hypertension Mother    • Dementia Mother    • Bone cancer Father    • No Known Problems Brother    • No Known Problems Brother           Review of Systems:   Negative  ros    Physical Examination:  General - well developed  female in no distress.  HEENT - pupils are equal, conjunctiva are pink, sclera are non-icteric.  Mouth - mucous membranes are moist. Speech is normal with no hoarseness.  Neck - supple, no adenopathy or masses, no JVD.  Chest - clear.  Heart - regular rate and rhythm.  Abomen - soft, non-tender, non-distended, no masses or hernias.   Ext - no edema or cyanosis, capillary refill brisk.    Impression:  68 y.o. female for screening colonoscopy.    Plan:  Screening colonoscopy.  Generalities of the procedure were described.  Risks of bleeding and/or bowel perforation were discussed.      Signature: Margarito Chino MD    CC: Keisha Carver MD

## 2018-06-05 NOTE — ANESTHESIA PREPROCEDURE EVALUATION
Anesthesia Evaluation     Patient summary reviewed and Nursing notes reviewed                Airway   Mallampati: I  TM distance: <3 FB  Neck ROM: full  no difficulty expected  Dental - normal exam     Pulmonary - negative pulmonary ROS and normal exam   Cardiovascular - normal exam    (+) hypertension, hyperlipidemia,       Neuro/Psych  (+) dizziness/light headedness,     GI/Hepatic/Renal/Endo    (+) obesity,     (-) morbid obesity    Musculoskeletal (-) negative ROS    Abdominal  - normal exam    Bowel sounds: normal.   Substance History - negative use     OB/GYN negative ob/gyn ROS         Other      history of cancer                    Anesthesia Plan    ASA 3     MAC     Anesthetic plan and risks discussed with patient.

## 2018-06-05 NOTE — ANESTHESIA POSTPROCEDURE EVALUATION
Patient: Melody Guzman    Procedure Summary     Date:  06/05/18 Room / Location:  [facility] / [facility]    Anesthesia Start:  0904 Anesthesia Stop:  0939    Procedure:  COLONOSCOPY TO CECUM WITH COLD POLYPECCTOMIES (N/A ) Diagnosis:      Surgeon:  Margarito Chino MD Provider:  Jose Guardado MD    Anesthesia Type:  MAC ASA Status:  3          Anesthesia Type: MAC  Last vitals  BP   150/94 (06/05/18 0945)   Temp   36.4 °C (97.6 °F) (06/05/18 0935)   Pulse   50 (06/05/18 0945)   Resp   18 (06/05/18 0945)     SpO2   98 % (06/05/18 0945)     Post Anesthesia Care and Evaluation    Patient location during evaluation: PACU  Patient participation: complete - patient participated  Level of consciousness: awake  Pain score: 0  Pain management: adequate  Airway patency: patent  Anesthetic complications: No anesthetic complications  PONV Status: none  Cardiovascular status: acceptable  Respiratory status: acceptable  Hydration status: acceptable    Comments: /94 (BP Location: Left arm, Patient Position: Lying)   Pulse 50   Temp 36.4 °C (97.6 °F) (Oral)   Resp 18   Wt 91.6 kg (202 lb)   LMP  (LMP Unknown)   SpO2 98%   BMI 36.93 kg/m²

## 2018-06-05 NOTE — OP NOTE
Procedure Note    Melody Guzman  1950  [id number]  6/5/2018 6/5/2018      Pre-operative Diagnosis: screening    Post-operative Diagnosis: polyps    Procedure: Colonoscopy with polypectomy (cold biopsy)    Surgeon:  Margarito Chino M.D,  [facility].    Sedation: MAC    Procedure Details:  Informed consent was obtained for the procedure, including sedation.  Risks of perforation, hemorrhage, adverse drug reaction and aspiration were discussed.The patient was monitored continuously with ECG tracing, pulse oximetry, blood pressure monitoring, and direct observations. The patient was placed in the left lateral decubitus position. The patient was given intravenous sedation by anesthesia.     A digital rectal examination was performed.  The variable-stiffness pediatric colonoscope was inserted into the rectum and advanced under direct vision to the cecum, which was identified by the ileocecal valve and appendiceal orifice.  The quality of the colonic preparation was good. A retroflexed view of the right colon was normal.     Careful inspection was made as the colonoscope was withdrawn, including a retroflexed view of the rectum; findings and interventions are described below.  Digital exam of the rectum was performed which revealed no abnormalities except a small rectocele.    Colonoscopy findings:  1.  Diverticulosis  was mild  2.  Small  Polyps  in the sigmoid- removed with cold biopsy forceps and submitted to the lab     Specimens:        ID Type Source Tests Collected by Time   A : SIGMOID POLYPS Polyp Large Intestine, Sigmoid Colon TISSUE PATHOLOGY EXAM Margarito Chino MD 6/5/2018 0929         Complications: none    Recommendations:  -If adenoma is present, repeat colonoscopy in 5 years.    Margarito Chino MD

## 2018-06-05 NOTE — DISCHARGE INSTRUCTIONS
For the next 24 hours patient needs to be with a responsible adult.    For 24 hours DO NOT drive, operate machinery, appliances, drink alcohol, make important decisions or sign legal documents.    Start with a light or bland diet and advance to regular diet as tolerated.    Follow recommendations on procedure report provided by your doctor.    Call Dr lopes  for problems [phone]    Problems may include but not limited to: large amounts of bleeding, trouble breathing, repeated vomiting, severe unrelieved pain, fever or chills.

## 2018-06-06 LAB
CYTO UR: NORMAL
LAB AP CASE REPORT: NORMAL
Lab: NORMAL
PATH REPORT.FINAL DX SPEC: NORMAL
PATH REPORT.GROSS SPEC: NORMAL

## 2018-07-16 ENCOUNTER — OFFICE VISIT (OUTPATIENT)
Dept: FAMILY MEDICINE CLINIC | Facility: CLINIC | Age: 68
End: 2018-07-16

## 2018-07-16 ENCOUNTER — TRANSCRIBE ORDERS (OUTPATIENT)
Dept: ADMINISTRATIVE | Facility: HOSPITAL | Age: 68
End: 2018-07-16

## 2018-07-16 VITALS
HEART RATE: 56 BPM | BODY MASS INDEX: 37.17 KG/M2 | OXYGEN SATURATION: 100 % | DIASTOLIC BLOOD PRESSURE: 80 MMHG | WEIGHT: 202 LBS | SYSTOLIC BLOOD PRESSURE: 140 MMHG | HEIGHT: 62 IN

## 2018-07-16 DIAGNOSIS — E78.00 PURE HYPERCHOLESTEROLEMIA: Primary | ICD-10-CM

## 2018-07-16 DIAGNOSIS — H10.13 ALLERGIC CONJUNCTIVITIS OF BOTH EYES: ICD-10-CM

## 2018-07-16 DIAGNOSIS — I10 ESSENTIAL HYPERTENSION: ICD-10-CM

## 2018-07-16 DIAGNOSIS — Z12.31 VISIT FOR SCREENING MAMMOGRAM: Primary | ICD-10-CM

## 2018-07-16 DIAGNOSIS — Z23 ENCOUNTER FOR IMMUNIZATION: ICD-10-CM

## 2018-07-16 DIAGNOSIS — Z23 NEED FOR PNEUMOCOCCAL VACCINATION: ICD-10-CM

## 2018-07-16 PROCEDURE — 99214 OFFICE O/P EST MOD 30 MIN: CPT | Performed by: FAMILY MEDICINE

## 2018-07-16 PROCEDURE — 90670 PCV13 VACCINE IM: CPT | Performed by: FAMILY MEDICINE

## 2018-07-16 PROCEDURE — G0009 ADMIN PNEUMOCOCCAL VACCINE: HCPCS | Performed by: FAMILY MEDICINE

## 2018-07-16 NOTE — PROGRESS NOTES
"Subjective   Melody Guzman is a 68 y.o. female.     Chief Complaint   Patient presents with   • Hypertension   • Immunizations     dicuss pnuemonia shots        History of Present Illness    Hyperlipidemia  She has been walking and eating lots of veggies and fruits. Has lost about 15 lbs. Would like to avoid going on cholesterol medication.    Hypertension  Fairly well controlled. She is on atenolol. She has forgotten to take her medication several times is the past month. Feels like that is why it is a little high today.    Immunizations  Ready to start the pneumonia series.    Allergic conjunctivitis  Patient uses Patanol drops when necessary.  She has significant allergies that affect her eyes.  His environmental allergies more affected by being outdoors.  We'll have days on and where she can go out to avoid significantly worsening symptoms she has not tried other medications for her allergies.    Had colonoscopy 6/5/18 with one sigmoid polypectomy.  Pathology returned as tubular adenoma, low-grade dysplasia. She saw Dr. Margarito Chino.     The following portions of the patient's history were reviewed and updated as appropriate: allergies, current medications and problem list.    Review of Systems   Constitutional: Negative for activity change, appetite change and unexpected weight change.   Respiratory: Negative for shortness of breath.    Cardiovascular: Negative for chest pain and leg swelling.       Objective   Blood pressure 140/80, pulse 56, height 157.5 cm (62.01\"), weight 91.6 kg (202 lb), SpO2 100 %.  Physical Exam   Constitutional: She is oriented to person, place, and time. She appears well-nourished. No distress.   Eyes: Conjunctivae are normal. Right eye exhibits no discharge. Left eye exhibits no discharge.   Cardiovascular: Normal rate, regular rhythm, normal heart sounds and intact distal pulses.    Pulmonary/Chest: Effort normal and breath sounds normal. No respiratory distress. "   Musculoskeletal: She exhibits no edema.   Neurological: She is alert and oriented to person, place, and time.   Psychiatric: She has a normal mood and affect. Her behavior is normal.   Vitals reviewed.      Assessment/Plan   Melody was seen today for hypertension and immunizations.    Diagnoses and all orders for this visit:    Pure hypercholesterolemia  -     Lipid Panel  Encouraged to continue exercise and diet changes.  Essential hypertension  -     Comprehensive Metabolic Panel  Well-controlled.  Encouraged her to come up with method to continue her atenolol without missing.  Need for pneumococcal vaccination    Allergic conjunctivitis of both eyes  Discussed addition of intranasal steroids.  Instruction on how to administer.  Patient voiced understanding and will trial these to improve her symptoms.  Encounter for immunization  -     pneumococcal conj. 13-valent (PREVNAR-13) vaccine 0.5 mL; Inject 0.5 mL into the shoulder, thigh, or buttocks 1 (One) Time.      Follow-up 6 months sooner if needed.

## 2018-07-17 LAB
ALBUMIN SERPL-MCNC: 4.4 G/DL (ref 3.6–4.8)
ALBUMIN/GLOB SERPL: 1.5 {RATIO} (ref 1.2–2.2)
ALP SERPL-CCNC: 52 IU/L (ref 39–117)
ALT SERPL-CCNC: 12 IU/L (ref 0–32)
AST SERPL-CCNC: 19 IU/L (ref 0–40)
BILIRUB SERPL-MCNC: 0.4 MG/DL (ref 0–1.2)
BUN SERPL-MCNC: 11 MG/DL (ref 8–27)
BUN/CREAT SERPL: 12 (ref 12–28)
CALCIUM SERPL-MCNC: 9.3 MG/DL (ref 8.7–10.3)
CHLORIDE SERPL-SCNC: 104 MMOL/L (ref 96–106)
CHOLEST SERPL-MCNC: 268 MG/DL (ref 100–199)
CO2 SERPL-SCNC: 25 MMOL/L (ref 20–29)
CREAT SERPL-MCNC: 0.91 MG/DL (ref 0.57–1)
GLOBULIN SER CALC-MCNC: 3 G/DL (ref 1.5–4.5)
GLUCOSE SERPL-MCNC: 99 MG/DL (ref 65–99)
HDLC SERPL-MCNC: 58 MG/DL
LDLC SERPL CALC-MCNC: 180 MG/DL (ref 0–99)
POTASSIUM SERPL-SCNC: 4.1 MMOL/L (ref 3.5–5.2)
PROT SERPL-MCNC: 7.4 G/DL (ref 6–8.5)
SODIUM SERPL-SCNC: 140 MMOL/L (ref 134–144)
TRIGL SERPL-MCNC: 151 MG/DL (ref 0–149)
VLDLC SERPL CALC-MCNC: 30 MG/DL (ref 5–40)

## 2018-07-20 ENCOUNTER — TELEPHONE (OUTPATIENT)
Dept: FAMILY MEDICINE CLINIC | Facility: CLINIC | Age: 68
End: 2018-07-20

## 2018-10-15 ENCOUNTER — TELEPHONE (OUTPATIENT)
Dept: FAMILY MEDICINE CLINIC | Facility: CLINIC | Age: 68
End: 2018-10-15

## 2018-10-15 DIAGNOSIS — B37.2 YEAST DERMATITIS: ICD-10-CM

## 2018-10-15 DIAGNOSIS — L30.8 OTHER ECZEMA: ICD-10-CM

## 2018-10-15 RX ORDER — TRIAMCINOLONE ACETONIDE 0.25 MG/G
CREAM TOPICAL 3 TIMES DAILY
Qty: 30 G | Refills: 11 | Status: SHIPPED | OUTPATIENT
Start: 2018-10-15

## 2018-10-15 RX ORDER — KETOCONAZOLE 20 MG/G
CREAM TOPICAL 2 TIMES DAILY
Qty: 30 G | Refills: 11 | Status: SHIPPED | OUTPATIENT
Start: 2018-10-15 | End: 2018-12-20

## 2018-10-18 ENCOUNTER — FLU SHOT (OUTPATIENT)
Dept: FAMILY MEDICINE CLINIC | Facility: CLINIC | Age: 68
End: 2018-10-18

## 2018-10-18 DIAGNOSIS — Z23 NEED FOR IMMUNIZATION AGAINST INFLUENZA: Primary | ICD-10-CM

## 2018-10-18 PROCEDURE — G0008 ADMIN INFLUENZA VIRUS VAC: HCPCS | Performed by: FAMILY MEDICINE

## 2018-10-18 PROCEDURE — 90662 IIV NO PRSV INCREASED AG IM: CPT | Performed by: FAMILY MEDICINE

## 2018-10-24 ENCOUNTER — HOSPITAL ENCOUNTER (OUTPATIENT)
Dept: MAMMOGRAPHY | Facility: HOSPITAL | Age: 68
Discharge: HOME | End: 2018-10-24
Admitting: FAMILY MEDICINE

## 2018-10-24 DIAGNOSIS — Z12.31 VISIT FOR SCREENING MAMMOGRAM: ICD-10-CM

## 2018-10-24 PROCEDURE — 77067 SCR MAMMO BI INCL CAD: CPT

## 2018-11-01 ENCOUNTER — TELEPHONE (OUTPATIENT)
Dept: FAMILY MEDICINE CLINIC | Facility: CLINIC | Age: 68
End: 2018-11-01

## 2018-12-20 ENCOUNTER — OFFICE VISIT (OUTPATIENT)
Dept: FAMILY MEDICINE CLINIC | Facility: CLINIC | Age: 68
End: 2018-12-20

## 2018-12-20 VITALS
DIASTOLIC BLOOD PRESSURE: 62 MMHG | TEMPERATURE: 97.8 F | HEART RATE: 61 BPM | OXYGEN SATURATION: 97 % | SYSTOLIC BLOOD PRESSURE: 142 MMHG | BODY MASS INDEX: 39.31 KG/M2 | WEIGHT: 215 LBS

## 2018-12-20 DIAGNOSIS — H66.002 ACUTE SUPPURATIVE OTITIS MEDIA OF LEFT EAR WITHOUT SPONTANEOUS RUPTURE OF TYMPANIC MEMBRANE, RECURRENCE NOT SPECIFIED: Primary | ICD-10-CM

## 2018-12-20 PROCEDURE — 99213 OFFICE O/P EST LOW 20 MIN: CPT | Performed by: NURSE PRACTITIONER

## 2018-12-20 RX ORDER — FLUCONAZOLE 150 MG/1
TABLET ORAL
COMMUNITY
Start: 2018-11-29 | End: 2018-12-20 | Stop reason: SDUPTHER

## 2018-12-20 RX ORDER — AZITHROMYCIN 250 MG/1
TABLET, FILM COATED ORAL
Qty: 6 TABLET | Refills: 0 | Status: SHIPPED | OUTPATIENT
Start: 2018-12-20 | End: 2019-03-06

## 2018-12-20 RX ORDER — FLUCONAZOLE 150 MG/1
150 TABLET ORAL ONCE
Qty: 1 TABLET | Refills: 0 | Status: SHIPPED | OUTPATIENT
Start: 2018-12-20 | End: 2018-12-20

## 2018-12-20 RX ORDER — FLUCONAZOLE 150 MG/1
TABLET ORAL
Qty: 2 TABLET | Refills: 0 | Status: SHIPPED | OUTPATIENT
Start: 2018-12-20 | End: 2020-01-03 | Stop reason: SDUPTHER

## 2018-12-20 RX ORDER — KETOCONAZOLE 20 MG/G
CREAM TOPICAL
COMMUNITY
Start: 2018-11-16

## 2018-12-20 NOTE — PROGRESS NOTES
Subjective   Melody Guzman is a 68 y.o. female presents with five day history of left ear pain, popping and sharp shooting pain. States it has been getting worse over the past few days but not as prominent today.    Earache    There is pain in the left ear. This is a new problem. The current episode started in the past 7 days. The problem occurs hourly. The problem has been waxing and waning. There has been no fever. The pain is at a severity of 4/10. The pain is mild. Associated symptoms include rhinorrhea and a sore throat. Pertinent negatives include no abdominal pain, coughing, diarrhea, ear discharge, headaches, hearing loss, neck pain, rash or vomiting. She has tried nothing for the symptoms. The treatment provided no relief.        The following portions of the patient's history were reviewed and updated as appropriate: allergies, current medications, past family history, past medical history, past social history, past surgical history and problem list.    Review of Systems   HENT: Positive for ear pain, rhinorrhea and sore throat. Negative for ear discharge and hearing loss.    Respiratory: Negative for cough.    Gastrointestinal: Negative for abdominal pain, diarrhea and vomiting.   Musculoskeletal: Negative for neck pain.   Skin: Negative for rash.   Neurological: Negative for headaches.       Objective   Physical Exam   Constitutional: She is oriented to person, place, and time. She appears well-developed and well-nourished.   HENT:   Head: Normocephalic and atraumatic.   Right Ear: Tympanic membrane, external ear and ear canal normal.   Left Ear: External ear and ear canal normal. Tympanic membrane is erythematous and bulging.   Nose: Nose normal.   Mouth/Throat: Oropharynx is clear and moist. No oropharyngeal exudate.   Eyes: Conjunctivae are normal. Pupils are equal, round, and reactive to light.   Neck: Neck supple.   Cardiovascular: Normal rate, regular rhythm and normal heart sounds. Exam reveals  no gallop and no friction rub.   No murmur heard.  Pulmonary/Chest: Effort normal and breath sounds normal. No stridor. No respiratory distress. She has no wheezes.   Lymphadenopathy:     She has no cervical adenopathy.   Neurological: She is alert and oriented to person, place, and time.   Psychiatric: She has a normal mood and affect.       Assessment/Plan   Melody was seen today for hypertension.    Diagnoses and all orders for this visit:    Acute suppurative otitis media of left ear without spontaneous rupture of tympanic membrane, recurrence not specified    Other orders  -     azithromycin (ZITHROMAX Z-JONAS) 250 MG tablet; Take 2 tablets the first day, then 1 tablet daily for 4 days.  -     fluconazole (DIFLUCAN) 150 MG tablet; Take 1 tablet by mouth 1 (One) Time for 1 dose.  -     fluconazole (DIFLUCAN) 150 MG tablet; Take one tab po at onset of symptoms, repeat in 4 days x 1      Recommend to start zpak as directed  Diflucan given as needed  Continue otc probiotic  Start nasal spray such as flonase  Follow up for no improvement or worsening symptoms

## 2018-12-27 DIAGNOSIS — I10 ESSENTIAL HYPERTENSION: ICD-10-CM

## 2018-12-27 RX ORDER — ATENOLOL 50 MG/1
TABLET ORAL
Qty: 30 TABLET | Refills: 0 | Status: SHIPPED | OUTPATIENT
Start: 2018-12-27 | End: 2019-01-30 | Stop reason: SDUPTHER

## 2019-01-30 DIAGNOSIS — I10 ESSENTIAL HYPERTENSION: ICD-10-CM

## 2019-01-30 RX ORDER — ATENOLOL 50 MG/1
50 TABLET ORAL DAILY
Qty: 30 TABLET | Refills: 3 | Status: SHIPPED | OUTPATIENT
Start: 2019-01-30 | End: 2019-05-21 | Stop reason: SDUPTHER

## 2019-03-06 ENCOUNTER — OFFICE VISIT (OUTPATIENT)
Dept: FAMILY MEDICINE CLINIC | Facility: CLINIC | Age: 69
End: 2019-03-06

## 2019-03-06 VITALS
HEART RATE: 52 BPM | RESPIRATION RATE: 15 BRPM | BODY MASS INDEX: 38.15 KG/M2 | DIASTOLIC BLOOD PRESSURE: 72 MMHG | HEIGHT: 62 IN | SYSTOLIC BLOOD PRESSURE: 122 MMHG | OXYGEN SATURATION: 95 % | WEIGHT: 207.3 LBS | TEMPERATURE: 97.8 F

## 2019-03-06 DIAGNOSIS — I10 ESSENTIAL HYPERTENSION: Primary | ICD-10-CM

## 2019-03-06 DIAGNOSIS — H10.13 ALLERGIC CONJUNCTIVITIS OF BOTH EYES: ICD-10-CM

## 2019-03-06 DIAGNOSIS — R42 DIZZINESS: ICD-10-CM

## 2019-03-06 DIAGNOSIS — R42 VERTIGO: ICD-10-CM

## 2019-03-06 PROCEDURE — 99214 OFFICE O/P EST MOD 30 MIN: CPT | Performed by: FAMILY MEDICINE

## 2019-03-06 RX ORDER — OLOPATADINE HYDROCHLORIDE 1 MG/ML
1 SOLUTION/ DROPS OPHTHALMIC 2 TIMES DAILY
Qty: 5 ML | Refills: 12 | Status: SHIPPED | OUTPATIENT
Start: 2019-03-06 | End: 2020-03-06

## 2019-03-06 RX ORDER — MECLIZINE HYDROCHLORIDE 25 MG/1
25 TABLET ORAL 3 TIMES DAILY PRN
Qty: 60 TABLET | Refills: 5 | Status: SHIPPED | OUTPATIENT
Start: 2019-03-06 | End: 2020-11-30 | Stop reason: SDUPTHER

## 2019-03-06 NOTE — PROGRESS NOTES
"Chief Complaint   Patient presents with   • Hypertension     f/u    • Med Refill     antivert, patanol       Subjective   Melody Guzman is a 68 y.o. female.     History of Present Illness   HTN  She is just on the BP medicine atenolol.     Vertigo  Does use the meclizine prn. It can be months in between use. She just had it a couple of weeks ago. Feels like her sinuses effect it. Does not have n/v. Just feels bad, feels like her heart is racing, if she can take deep breaths and stays calm this helps. She turns of the lights and takes her meclizine, lies down. Usually lasts 1-2 days if she gets her meclizine right away if there is a delay it will go on for at least a few days.     Allergic conjunctivitis  Using the drops. Work great. She is outside more now that the weather is getting nice and walking regularly. Not having any trouble with her eyes.     The following portions of the patient's history were reviewed and updated as appropriate: allergies, current medications, past medical history, past social history and problem list.    Review of Systems   Respiratory: Negative.    Cardiovascular: Negative.    Neurological: Negative.        Vitals:    03/06/19 0950   BP: 122/72   BP Location: Left arm   Patient Position: Sitting   Cuff Size: Adult   Pulse: 52   Resp: 15   Temp: 97.8 °F (36.6 °C)   TempSrc: Oral   SpO2: 95%   Weight: 94 kg (207 lb 4.8 oz)   Height: 157.5 cm (62.01\")       Objective   Physical Exam   Constitutional: She is oriented to person, place, and time. She appears well-nourished. No distress.   Eyes: Conjunctivae are normal. Right eye exhibits no discharge. Left eye exhibits no discharge. No scleral icterus.   Cardiovascular: Normal rate, regular rhythm, normal heart sounds and intact distal pulses. Exam reveals no gallop and no friction rub.   No murmur heard.  Pulmonary/Chest: Effort normal and breath sounds normal. No respiratory distress. She has no wheezes.   Musculoskeletal: She exhibits " no edema.   Neurological: She is alert and oriented to person, place, and time.   Psychiatric: She has a normal mood and affect. Her behavior is normal.   Vitals reviewed.      Assessment/Plan   Melody was seen today for hypertension and med refill.    Diagnoses and all orders for this visit:    Essential hypertension  Had a discussion regarding the recommendations for BP med selection and atenolol is not first, second or third line. She does not want to change.   Allergic conjunctivitis of both eyes  -     olopatadine (PATANOL) 0.1 % ophthalmic solution; Administer 1 drop to both eyes 2 (Two) Times a Day.    Dizziness  -     meclizine (ANTIVERT) 25 MG tablet; Take 1 tablet by mouth 3 (Three) Times a Day As Needed for dizziness.    Vertigo  Well controlled with the meclizine. Intermittent. Doing well with management at home.       Pt is due for her medicare wellness, return in 3-6 months for this follow up. Needs hep C screening, labs, and shingrix. She has shot record she is going to bring, she may also need Pneumovax 23.

## 2019-05-21 DIAGNOSIS — I10 ESSENTIAL HYPERTENSION: ICD-10-CM

## 2019-05-21 RX ORDER — ATENOLOL 50 MG/1
TABLET ORAL
Qty: 30 TABLET | Refills: 0 | Status: SHIPPED | OUTPATIENT
Start: 2019-05-21 | End: 2019-06-18 | Stop reason: SDUPTHER

## 2019-06-18 DIAGNOSIS — I10 ESSENTIAL HYPERTENSION: ICD-10-CM

## 2019-06-18 RX ORDER — ATENOLOL 50 MG/1
TABLET ORAL
Qty: 30 TABLET | Refills: 4 | Status: SHIPPED | OUTPATIENT
Start: 2019-06-18 | End: 2019-11-25 | Stop reason: SDUPTHER

## 2019-07-29 ENCOUNTER — TRANSCRIBE ORDERS (OUTPATIENT)
Dept: ADMINISTRATIVE | Facility: HOSPITAL | Age: 69
End: 2019-07-29

## 2019-07-29 DIAGNOSIS — Z13.9 VISIT FOR SCREENING: Primary | ICD-10-CM

## 2019-10-29 ENCOUNTER — HOSPITAL ENCOUNTER (OUTPATIENT)
Dept: MAMMOGRAPHY | Facility: HOSPITAL | Age: 69
Discharge: HOME | End: 2019-10-29
Admitting: FAMILY MEDICINE

## 2019-10-29 DIAGNOSIS — Z13.9 VISIT FOR SCREENING: ICD-10-CM

## 2019-10-29 PROCEDURE — 77067 SCR MAMMO BI INCL CAD: CPT

## 2019-10-29 PROCEDURE — 77063 BREAST TOMOSYNTHESIS BI: CPT

## 2019-11-25 ENCOUNTER — OFFICE VISIT (OUTPATIENT)
Dept: FAMILY MEDICINE CLINIC | Facility: CLINIC | Age: 69
End: 2019-11-25

## 2019-11-25 VITALS
BODY MASS INDEX: 40.48 KG/M2 | HEIGHT: 62 IN | RESPIRATION RATE: 13 BRPM | DIASTOLIC BLOOD PRESSURE: 88 MMHG | WEIGHT: 220 LBS | HEART RATE: 66 BPM | TEMPERATURE: 98.1 F | OXYGEN SATURATION: 98 % | SYSTOLIC BLOOD PRESSURE: 136 MMHG

## 2019-11-25 DIAGNOSIS — I10 ESSENTIAL HYPERTENSION: ICD-10-CM

## 2019-11-25 DIAGNOSIS — E78.00 PURE HYPERCHOLESTEROLEMIA: ICD-10-CM

## 2019-11-25 DIAGNOSIS — Z00.00 WELLNESS EXAMINATION: Primary | ICD-10-CM

## 2019-11-25 PROCEDURE — G0008 ADMIN INFLUENZA VIRUS VAC: HCPCS | Performed by: FAMILY MEDICINE

## 2019-11-25 PROCEDURE — 90653 IIV ADJUVANT VACCINE IM: CPT | Performed by: FAMILY MEDICINE

## 2019-11-25 PROCEDURE — G0439 PPPS, SUBSEQ VISIT: HCPCS | Performed by: FAMILY MEDICINE

## 2019-11-25 RX ORDER — ATENOLOL 50 MG/1
50 TABLET ORAL DAILY
Qty: 90 TABLET | Refills: 1 | Status: SHIPPED | OUTPATIENT
Start: 2019-11-25 | End: 2020-05-26

## 2019-12-27 ENCOUNTER — OFFICE VISIT (OUTPATIENT)
Dept: FAMILY MEDICINE CLINIC | Facility: CLINIC | Age: 69
End: 2019-12-27

## 2019-12-27 VITALS
BODY MASS INDEX: 49.89 KG/M2 | DIASTOLIC BLOOD PRESSURE: 94 MMHG | HEART RATE: 105 BPM | HEIGHT: 55 IN | OXYGEN SATURATION: 95 % | SYSTOLIC BLOOD PRESSURE: 172 MMHG | WEIGHT: 215.6 LBS | TEMPERATURE: 99.5 F

## 2019-12-27 DIAGNOSIS — J06.9 ACUTE URI: Primary | ICD-10-CM

## 2019-12-27 PROCEDURE — 99213 OFFICE O/P EST LOW 20 MIN: CPT | Performed by: NURSE PRACTITIONER

## 2019-12-27 RX ORDER — DEXTROMETHORPHAN HYDROBROMIDE AND PROMETHAZINE HYDROCHLORIDE 15; 6.25 MG/5ML; MG/5ML
5 SYRUP ORAL 4 TIMES DAILY PRN
Qty: 240 ML | Refills: 0 | Status: SHIPPED | OUTPATIENT
Start: 2019-12-27 | End: 2022-12-16 | Stop reason: SDUPTHER

## 2019-12-27 RX ORDER — CIPROFLOXACIN 500 MG/1
TABLET, FILM COATED ORAL
COMMUNITY
Start: 2019-12-18 | End: 2019-12-27

## 2019-12-27 RX ORDER — DOXYCYCLINE 100 MG/1
100 CAPSULE ORAL EVERY 12 HOURS SCHEDULED
Qty: 20 CAPSULE | Refills: 0 | Status: SHIPPED | OUTPATIENT
Start: 2019-12-27 | End: 2020-11-30

## 2019-12-27 RX ORDER — METHYLPREDNISOLONE 4 MG/1
TABLET ORAL
Qty: 21 TABLET | Refills: 0 | Status: SHIPPED | OUTPATIENT
Start: 2019-12-27 | End: 2020-11-30

## 2019-12-27 NOTE — PROGRESS NOTES
"Subjective   Melody Guzman is a 69 y.o. female   who presents for   Chief Complaint   Patient presents with   • Cough     x1week   • URI     Started one week ago, sneezing, coughing, now with nasal congestion, hoarseness  Cough, productive with some wheezing/soa, yellow sputum production, thick, difficult to expectorate  Decreased appetite  Drinking hot tea with honey, orange juice and water  Diarrhea this morning with incontinence, then every few minutes        /94   Pulse 105   Temp 99.5 °F (37.5 °C)   Ht 62 cm (24.41\")   Wt 97.8 kg (215 lb 9.6 oz)   LMP  (LMP Unknown)   SpO2 95%   .34 kg/m²       History of Present Illness   Cough   This is a new problem. The current episode started in the past 7 days. The problem has been gradually worsening. The cough is non-productive. Associated symptoms include ear congestion, nasal congestion, postnasal drip, a sore throat and wheezing. Pertinent negatives include no chest pain, chills, ear pain, fever, headaches, heartburn, hemoptysis, myalgias, rash, rhinorrhea, shortness of breath, sweats or weight loss. Nothing aggravates the symptoms. She has tried nothing for the symptoms. The treatment provided no relief.   URI    This is a new problem. The current episode started in the past 7 days. The problem has been unchanged. Associated symptoms include congestion, coughing, sneezing, a sore throat and wheezing. Pertinent negatives include no abdominal pain, chest pain, diarrhea, dysuria, ear pain, headaches, joint swelling, nausea, neck pain, plugged ear sensation, rash, rhinorrhea, sinus pain, swollen glands or vomiting. She has tried nothing for the symptoms. The treatment provided no relief.       The following portions of the patient's history were reviewed and updated as appropriate: allergies, current medications, past family history, past medical history, past social history, past surgical history and problem list.    Review of Systems  Review of " Systems   Constitutional: Negative for chills, fever and weight loss.   HENT: Positive for congestion, postnasal drip, sneezing and sore throat. Negative for ear pain, rhinorrhea and sinus pain.    Respiratory: Positive for cough and wheezing. Negative for hemoptysis and shortness of breath.    Cardiovascular: Negative for chest pain.   Gastrointestinal: Negative for abdominal pain, diarrhea, heartburn, nausea and vomiting.   Genitourinary: Negative for dysuria.   Musculoskeletal: Negative for myalgias and neck pain.   Skin: Negative for rash.   Neurological: Negative for headaches.       Objective   Physical Exam  Physical Exam   Constitutional: She is oriented to person, place, and time. She appears well-developed and well-nourished. No distress.   HENT:   Head: Normocephalic and atraumatic.   Right Ear: Tympanic membrane, external ear and ear canal normal.   Left Ear: Tympanic membrane, external ear and ear canal normal.   Nose: Nose normal.   Mouth/Throat: Oropharynx is clear and moist. No oropharyngeal exudate.   Neck: Neck supple.   Cardiovascular: Normal rate, regular rhythm and normal heart sounds. Exam reveals no gallop and no friction rub.   No murmur heard.  Pulmonary/Chest: Effort normal. No stridor. No respiratory distress. She has wheezes. She has no rales.   Abdominal: Soft. Bowel sounds are normal. She exhibits no distension. There is no tenderness.   Neurological: She is alert and oriented to person, place, and time.   Skin: Skin is warm and dry. She is not diaphoretic.   Psychiatric: She has a normal mood and affect.   Vitals reviewed.        Assessment/Plan   Melody was seen today for cough and uri.    Diagnoses and all orders for this visit:    Acute URI    Other orders  -     doxycycline (MONODOX) 100 MG capsule; Take 1 capsule by mouth Every 12 (Twelve) Hours.  -     methylPREDNISolone (MEDROL, JONAS,) 4 MG tablet; Take as directed on package instructions.  -     promethazine-dextromethorphan  (PROMETHAZINE-DM) 6.25-15 MG/5ML syrup; Take 5 mL by mouth 4 (Four) Times a Day As Needed for Cough.    start medications as prescribed, follow up for no improvement/worsening symptoms  Increase fluids and rest

## 2020-01-03 ENCOUNTER — OFFICE VISIT (OUTPATIENT)
Dept: FAMILY MEDICINE CLINIC | Facility: CLINIC | Age: 70
End: 2020-01-03

## 2020-01-03 VITALS
TEMPERATURE: 97.7 F | HEART RATE: 56 BPM | DIASTOLIC BLOOD PRESSURE: 92 MMHG | SYSTOLIC BLOOD PRESSURE: 170 MMHG | WEIGHT: 215.6 LBS | OXYGEN SATURATION: 99 % | BODY MASS INDEX: 39.67 KG/M2 | HEIGHT: 62 IN

## 2020-01-03 DIAGNOSIS — J06.9 ACUTE URI: Primary | ICD-10-CM

## 2020-01-03 PROCEDURE — 99213 OFFICE O/P EST LOW 20 MIN: CPT | Performed by: NURSE PRACTITIONER

## 2020-01-03 RX ORDER — FLUCONAZOLE 150 MG/1
TABLET ORAL
Qty: 3 TABLET | Refills: 0 | Status: SHIPPED | OUTPATIENT
Start: 2020-01-03 | End: 2022-12-16

## 2020-01-03 NOTE — PROGRESS NOTES
"Subjective   Melody Guzman is a 69 y.o. female   who presents for   Chief Complaint   Patient presents with   • Bronchitis     f/u       Improvement in symptoms  Bilateral ear popping/crackling  Post nasal drainage  Cough is productive, clearish now, occasionally yellow  Hoarseness still, did lose voice x 2 days    Pain in     /92   Pulse 56   Temp 97.7 °F (36.5 °C)   Ht 157.5 cm (62.01\")   Wt 97.8 kg (215 lb 9.6 oz)   LMP  (LMP Unknown)   SpO2 99%   BMI 39.42 kg/m²       History of Present Illness   URI    This is a new problem. The current episode started 1 to 4 weeks ago. The problem has been unchanged. Associated symptoms include congestion and coughing. Pertinent negatives include no abdominal pain, chest pain, diarrhea, dysuria, ear pain, headaches, joint pain, joint swelling, nausea, neck pain, plugged ear sensation, rash, rhinorrhea, sinus pain, sneezing, sore throat, swollen glands, vomiting or wheezing. Treatments tried: antibiotics.       The following portions of the patient's history were reviewed and updated as appropriate: allergies, current medications, past family history, past medical history, past social history, past surgical history and problem list.    Review of Systems  Review of Systems   HENT: Positive for congestion. Negative for ear pain, rhinorrhea, sinus pain, sneezing and sore throat.    Respiratory: Positive for cough. Negative for wheezing.    Cardiovascular: Negative for chest pain.   Gastrointestinal: Negative for abdominal pain, diarrhea, nausea and vomiting.   Genitourinary: Negative for dysuria.   Musculoskeletal: Negative for joint pain and neck pain.   Skin: Negative for rash.   Neurological: Negative for headaches.       Objective   Physical Exam  Physical Exam   Constitutional: She is oriented to person, place, and time. She appears well-developed and well-nourished. No distress.   Neck: Neck supple.   Cardiovascular: Normal rate, regular rhythm and normal " heart sounds. Exam reveals no gallop and no friction rub.   No murmur heard.  Pulmonary/Chest: Effort normal and breath sounds normal. No respiratory distress. She has no wheezes. She has no rales.   Abdominal: Soft. Bowel sounds are normal. She exhibits no distension. There is no tenderness.   Neurological: She is alert and oriented to person, place, and time.   Skin: Skin is warm and dry. She is not diaphoretic.   Psychiatric: She has a normal mood and affect.         Assessment/Plan   Melody was seen today for bronchitis.    Diagnoses and all orders for this visit:    Acute URI    Other orders  -     fluconazole (DIFLUCAN) 150 MG tablet; Take one tab po at onset of symptoms, repeat in 4 days x 2    diflucan as prescribed  Continue current treatment, increase fluids and rest  For worsening symptoms, follow up in office  Discussed symptoms may linger, for changes return, however should resolve

## 2020-03-06 DIAGNOSIS — H10.13 ALLERGIC CONJUNCTIVITIS OF BOTH EYES: ICD-10-CM

## 2020-03-06 RX ORDER — OLOPATADINE HYDROCHLORIDE 1 MG/ML
SOLUTION/ DROPS OPHTHALMIC
Qty: 5 ML | Refills: 0 | Status: SHIPPED | OUTPATIENT
Start: 2020-03-06 | End: 2020-10-01 | Stop reason: SDUPTHER

## 2020-03-27 DIAGNOSIS — L20.89 OTHER ATOPIC DERMATITIS: ICD-10-CM

## 2020-03-27 RX ORDER — HYDROXYZINE HYDROCHLORIDE 25 MG/1
25 TABLET, FILM COATED ORAL 3 TIMES DAILY PRN
Qty: 40 TABLET | Refills: 2 | Status: SHIPPED | OUTPATIENT
Start: 2020-03-27

## 2020-03-27 NOTE — TELEPHONE ENCOUNTER
PATIENT CALLED FOR MED REFILL. HER ALLERGIES ARE HITTING HARD    hydrOXYzine (ATARAX) 25 MG tablet    [id number] - [place], [place] - [address] - [phone]  - [phone]   [phone]    PATIENT CALL BACK NUMBER [phone]    PLEASE CALL WHEN SENT TO PHARMACY

## 2020-05-26 ENCOUNTER — TELEPHONE (OUTPATIENT)
Dept: FAMILY MEDICINE CLINIC | Facility: CLINIC | Age: 70
End: 2020-05-26

## 2020-05-26 DIAGNOSIS — I10 ESSENTIAL HYPERTENSION: ICD-10-CM

## 2020-05-26 RX ORDER — ATENOLOL 50 MG/1
TABLET ORAL
Qty: 90 TABLET | Refills: 1 | Status: SHIPPED | OUTPATIENT
Start: 2020-05-26 | End: 2020-05-28 | Stop reason: SDUPTHER

## 2020-05-26 NOTE — TELEPHONE ENCOUNTER
PATIENT CALLED IN TO REQUEST A NEW PRESCRIPTION WITH REFILLS  ON IT  FOR atenolol (TENORMIN) 50 MG tablet  ONE DAILY  30 TABLETS WITH REFILLS . SENT TO [id number] . PATIENT CALL BACK  [phone].

## 2020-05-28 RX ORDER — ATENOLOL 50 MG/1
50 TABLET ORAL DAILY
Qty: 30 TABLET | Refills: 5 | Status: SHIPPED | OUTPATIENT
Start: 2020-05-28 | End: 2021-06-18 | Stop reason: SDUPTHER

## 2020-10-01 DIAGNOSIS — H10.13 ALLERGIC CONJUNCTIVITIS OF BOTH EYES: ICD-10-CM

## 2020-10-01 PROCEDURE — 90694 VACC AIIV4 NO PRSRV 0.5ML IM: CPT | Performed by: FAMILY MEDICINE

## 2020-10-01 PROCEDURE — G0008 ADMIN INFLUENZA VIRUS VAC: HCPCS | Performed by: FAMILY MEDICINE

## 2020-10-01 RX ORDER — OLOPATADINE HYDROCHLORIDE 1 MG/ML
1 SOLUTION/ DROPS OPHTHALMIC 2 TIMES DAILY
Qty: 5 ML | Refills: 0 | Status: SHIPPED | OUTPATIENT
Start: 2020-10-01 | End: 2020-11-30 | Stop reason: SDUPTHER

## 2020-10-01 NOTE — TELEPHONE ENCOUNTER
She needs and annual visit for wellness in end of November or before the end of the year. Thanks.

## 2020-10-08 ENCOUNTER — TRANSCRIBE ORDERS (OUTPATIENT)
Dept: ADMINISTRATIVE | Facility: HOSPITAL | Age: 70
End: 2020-10-08

## 2020-10-08 ENCOUNTER — TELEPHONE (OUTPATIENT)
Dept: FAMILY MEDICINE CLINIC | Facility: CLINIC | Age: 70
End: 2020-10-08

## 2020-10-08 DIAGNOSIS — Z12.31 VISIT FOR SCREENING MAMMOGRAM: Primary | ICD-10-CM

## 2020-11-05 ENCOUNTER — HOSPITAL ENCOUNTER (OUTPATIENT)
Dept: MAMMOGRAPHY | Facility: HOSPITAL | Age: 70
Discharge: HOME | End: 2020-11-05
Admitting: FAMILY MEDICINE

## 2020-11-05 DIAGNOSIS — Z12.31 VISIT FOR SCREENING MAMMOGRAM: ICD-10-CM

## 2020-11-05 PROCEDURE — 77063 BREAST TOMOSYNTHESIS BI: CPT

## 2020-11-05 PROCEDURE — 77067 SCR MAMMO BI INCL CAD: CPT

## 2020-11-30 ENCOUNTER — OFFICE VISIT (OUTPATIENT)
Dept: FAMILY MEDICINE CLINIC | Facility: CLINIC | Age: 70
End: 2020-11-30

## 2020-11-30 VITALS
BODY MASS INDEX: 41.39 KG/M2 | TEMPERATURE: 96.8 F | RESPIRATION RATE: 16 BRPM | OXYGEN SATURATION: 98 % | HEIGHT: 62 IN | WEIGHT: 224.9 LBS | DIASTOLIC BLOOD PRESSURE: 98 MMHG | SYSTOLIC BLOOD PRESSURE: 160 MMHG | HEART RATE: 77 BPM

## 2020-11-30 DIAGNOSIS — I10 ESSENTIAL HYPERTENSION: ICD-10-CM

## 2020-11-30 DIAGNOSIS — R42 DIZZINESS: ICD-10-CM

## 2020-11-30 DIAGNOSIS — E78.00 PURE HYPERCHOLESTEROLEMIA: ICD-10-CM

## 2020-11-30 DIAGNOSIS — Z00.00 MEDICARE ANNUAL WELLNESS VISIT, SUBSEQUENT: Primary | ICD-10-CM

## 2020-11-30 DIAGNOSIS — Z23 NEED FOR VACCINATION: ICD-10-CM

## 2020-11-30 DIAGNOSIS — Z11.59 ENCOUNTER FOR HEPATITIS C SCREENING TEST FOR LOW RISK PATIENT: ICD-10-CM

## 2020-11-30 DIAGNOSIS — H10.13 ALLERGIC CONJUNCTIVITIS OF BOTH EYES: ICD-10-CM

## 2020-11-30 PROBLEM — S83.206A RIGHT KNEE MENISCAL TEAR: Status: ACTIVE | Noted: 2020-08-19

## 2020-11-30 PROCEDURE — G0439 PPPS, SUBSEQ VISIT: HCPCS | Performed by: FAMILY MEDICINE

## 2020-11-30 PROCEDURE — 99213 OFFICE O/P EST LOW 20 MIN: CPT | Performed by: FAMILY MEDICINE

## 2020-11-30 RX ORDER — LOSARTAN POTASSIUM 25 MG/1
25 TABLET ORAL NIGHTLY
Qty: 30 TABLET | Refills: 1 | Status: SHIPPED | OUTPATIENT
Start: 2020-11-30 | End: 2021-06-18

## 2020-11-30 RX ORDER — MECLIZINE HYDROCHLORIDE 25 MG/1
25 TABLET ORAL 3 TIMES DAILY PRN
Qty: 60 TABLET | Refills: 5 | Status: SHIPPED | OUTPATIENT
Start: 2020-11-30 | End: 2021-06-18 | Stop reason: SDUPTHER

## 2020-11-30 RX ORDER — OLOPATADINE HYDROCHLORIDE 1 MG/ML
1 SOLUTION/ DROPS OPHTHALMIC 2 TIMES DAILY
Qty: 5 ML | Refills: 0 | Status: SHIPPED | OUTPATIENT
Start: 2020-11-30 | End: 2021-12-14 | Stop reason: SDUPTHER

## 2020-11-30 NOTE — PROGRESS NOTES
The ABCs of the Annual Wellness Visit  Subsequent Medicare Wellness Visit    Chief Complaint   Patient presents with   • Medicare Wellness-subsequent       Subjective   History of Present Illness:  Melody Guzman is a 70 y.o. female who presents for a Subsequent Medicare Wellness Visit.    HEALTH RISK ASSESSMENT    Recent Hospitalizations:  No hospitalization(s) within the last year.    Current Medical Providers:  Patient Care Team:  Keisha Carver MD as PCP - General (Family Medicine)    Smoking Status:  Social History     Tobacco Use   Smoking Status Never Smoker   Smokeless Tobacco Never Used       Alcohol Consumption:  Social History     Substance and Sexual Activity   Alcohol Use No       Depression Screen:   PHQ-2/PHQ-9 Depression Screening 11/30/2020   Little interest or pleasure in doing things 0   Feeling down, depressed, or hopeless 0   Trouble falling or staying asleep, or sleeping too much -   Feeling tired or having little energy -   Poor appetite or overeating -   Feeling bad about yourself - or that you are a failure or have let yourself or your family down -   Trouble concentrating on things, such as reading the newspaper or watching television -   Moving or speaking so slowly that other people could have noticed. Or the opposite - being so fidgety or restless that you have been moving around a lot more than usual -   Thoughts that you would be better off dead, or of hurting yourself in some way -   Total Score 0   If you checked off any problems, how difficult have these problems made it for you to do your work, take care of things at home, or get along with other people? -       Fall Risk Screen:  STEADI Fall Risk Assessment was completed, and patient is at LOW risk for falls.Assessment completed on:11/30/2020    Health Habits and Functional and Cognitive Screening:  Functional & Cognitive Status 11/30/2020   Do you have difficulty preparing food and eating? No   Do you have difficulty bathing  yourself, getting dressed or grooming yourself? No   Do you have difficulty using the toilet? No   Do you have difficulty moving around from place to place? No   Do you have trouble with steps or getting out of a bed or a chair? No   Current Diet Well Balanced Diet   Dental Exam Up to date   Eye Exam Up to date   Exercise (times per week) 7 times per week   Current Exercise Activities Include Stretches and PT from the knee surgery   Do you need help using the phone?  No   Are you deaf or do you have serious difficulty hearing?  No   Do you need help with transportation? No   Do you need help shopping? No   Do you need help preparing meals?  No   Do you need help with housework?  No   Do you need help with laundry? No   Do you need help taking your medications? No   Do you need help managing money? No   Do you ever drive or ride in a car without wearing a seat belt? No   Have you felt unusual stress, anger or loneliness in the last month? No   Who do you live with? Alone   If you need help, do you have trouble finding someone available to you? No   Have you been bothered in the last four weeks by sexual problems? No   Do you have difficulty concentrating, remembering or making decisions? No         Does the patient have evidence of cognitive impairment? No    Asprin use counseling:Does not need ASA (and currently is not on it)    Age-appropriate Screening Schedule:  Refer to the list below for future screening recommendations based on patient's age, sex and/or medical conditions. Orders for these recommended tests are listed in the plan section. The patient has been provided with a written plan.    Health Maintenance   Topic Date Due   • ZOSTER VACCINE (2 of 3) 12/10/2013   • LIPID PANEL  07/16/2019   • INFLUENZA VACCINE  08/01/2020   • PAP SMEAR  11/04/2022   • MAMMOGRAM  11/05/2022   • TDAP/TD VACCINES (4 - Td) 08/10/2025   • COLONOSCOPY  06/05/2028          The following portions of the patient's history were  reviewed and updated as appropriate: allergies, current medications, past family history, past medical history, past social history, past surgical history and problem list.    Outpatient Medications Prior to Visit   Medication Sig Dispense Refill   • atenolol (TENORMIN) 50 MG tablet Take 1 tablet by mouth Daily. 30 tablet 5   • hydrOXYzine (ATARAX) 25 MG tablet Take 1 tablet by mouth 3 (Three) Times a Day As Needed for Itching. 40 tablet 2   • ketoconazole (NIZORAL) 2 % cream by Intratympanic route.     • triamcinolone (KENALOG) 0.025 % cream Apply  topically to the appropriate area as directed 3 (Three) Times a Day. 30 g 11   • meclizine (ANTIVERT) 25 MG tablet Take 1 tablet by mouth 3 (Three) Times a Day As Needed for dizziness. 60 tablet 5   • olopatadine (PATANOL) 0.1 % ophthalmic solution Administer 1 drop to both eyes 2 (Two) Times a Day. 5 mL 0   • fluconazole (DIFLUCAN) 150 MG tablet Take one tab po at onset of symptoms, repeat in 4 days x 2 3 tablet 0   • promethazine-dextromethorphan (PROMETHAZINE-DM) 6.25-15 MG/5ML syrup Take 5 mL by mouth 4 (Four) Times a Day As Needed for Cough. 240 mL 0   • doxycycline (MONODOX) 100 MG capsule Take 1 capsule by mouth Every 12 (Twelve) Hours. 20 capsule 0   • methylPREDNISolone (MEDROL, JONAS,) 4 MG tablet Take as directed on package instructions. 21 tablet 0     No facility-administered medications prior to visit.        Patient Active Problem List   Diagnosis   • Atopic rhinitis   • Gout   • Hypertension   • Other specified noninflammatory disorders of vulva and perineum   • Malignant neoplasm of endometrium (CMS/HCC)   • Morbid (severe) obesity due to excess calories (CMS/HCC)   • Vertigo   • Allergic conjunctivitis of both eyes   • Pure hypercholesterolemia   • Right knee meniscal tear       Advanced Care Planning:  ACP discussion was held with the patient during this visit. Patient does not have an advance directive, information provided.    Review of Systems  "  Respiratory: Negative.    Cardiovascular: Negative.    Neurological: Negative.        Compared to one year ago, the patient feels her physical health is better.  Compared to one year ago, the patient feels her mental health is the same.    Reviewed chart for potential of high risk medication in the elderly: yes  Reviewed chart for potential of harmful drug interactions in the elderly:yes    Objective         Vitals:    11/30/20 1351   BP: 160/98   BP Location: Left arm   Patient Position: Sitting   Cuff Size: Adult   Pulse: 77   Resp: 16   Temp: 96.8 °F (36 °C)   TempSrc: Infrared   SpO2: 98%   Weight: 102 kg (224 lb 14.4 oz)   Height: 157.5 cm (62.01\")   PainSc: 0-No pain       Body mass index is 41.12 kg/m².  Discussed the patient's BMI with her. The BMI is above average; BMI management plan is completed.    Physical Exam  Vitals signs reviewed.   Constitutional:       General: She is not in acute distress.  HENT:      Right Ear: Tympanic membrane, ear canal and external ear normal.      Left Ear: Tympanic membrane, ear canal and external ear normal.      Nose: Nose normal.      Mouth/Throat:      Mouth: Mucous membranes are moist.      Pharynx: Oropharynx is clear. No oropharyngeal exudate or posterior oropharyngeal erythema.   Eyes:      General: No scleral icterus.        Right eye: No discharge.         Left eye: No discharge.      Conjunctiva/sclera: Conjunctivae normal.   Neck:      Musculoskeletal: Neck supple.      Thyroid: No thyromegaly.      Vascular: No carotid bruit.   Cardiovascular:      Rate and Rhythm: Normal rate and regular rhythm.      Heart sounds: Normal heart sounds. No murmur. No friction rub. No gallop.    Pulmonary:      Effort: Pulmonary effort is normal. No respiratory distress.      Breath sounds: Normal breath sounds. No wheezing or rales.   Chest:      Chest wall: No tenderness.   Abdominal:      General: Bowel sounds are normal. There is no distension.      Palpations: Abdomen is " soft. There is no mass.      Tenderness: There is no abdominal tenderness. There is no guarding.   Musculoskeletal:         General: No deformity.      Right lower leg: No edema.      Left lower leg: No edema.   Lymphadenopathy:      Cervical: No cervical adenopathy.   Skin:     General: Skin is warm and dry.   Neurological:      Mental Status: She is alert and oriented to person, place, and time.      Motor: No abnormal muscle tone.      Coordination: Coordination normal.   Psychiatric:         Mood and Affect: Mood normal.         Behavior: Behavior normal.               Assessment/Plan   Medicare Risks and Personalized Health Plan  CMS Preventative Services Quick Reference  Advance Directive Discussion  Breast Cancer/Mammogram Screening  Colon Cancer Screening  Immunizations Discussed/Encouraged (specific immunizations; Pneumococcal 23 )  Obesity/Overweight   Osteoprorosis Risk    The above risks/problems have been discussed with the patient.  Pertinent information has been shared with the patient in the After Visit Summary.  Follow up plans and orders are seen below in the Assessment/Plan Section.    Diagnoses and all orders for this visit:    1. Medicare annual wellness visit, subsequent (Primary)    2. Allergic conjunctivitis of both eyes  -     olopatadine (PATANOL) 0.1 % ophthalmic solution; Administer 1 drop to both eyes 2 (Two) Times a Day.  Dispense: 5 mL; Refill: 0    3. Essential hypertension  -     CBC & Differential  -     Comprehensive Metabolic Panel    4. Pure hypercholesterolemia  -     Lipid Panel    5. Encounter for hepatitis C screening test for low risk patient  -     Hepatitis C Antibody    6. Dizziness  -     meclizine (ANTIVERT) 25 MG tablet; Take 1 tablet by mouth 3 (Three) Times a Day As Needed for Dizziness.  Dispense: 60 tablet; Refill: 5    Other orders  -     losartan (COZAAR) 25 MG tablet; Take 1 tablet by mouth Every Night.  Dispense: 30 tablet; Refill: 1      Follow Up:  No  follow-ups on file.     An After Visit Summary and PPPS were given to the patient.           Her BP is consistently elevated. She says was really high after knee surgery but coming down. Says she checks most days and has been staying right around 160 SBP which is improved. She had knee replacement surgery 9/2/20. Said really in a lot of pain with PT. She did not respond well to medications for surgery and had vomiting, HTN, headache in post op recovery.   We have talked about her BP several times before but this is higher than had been previously. And prior readings in chart with other visits show SBP in the 170x two visits.   She wanted to wait about 2-3 months before starting and I recommended strongly that we not wait. I talked about HTN risk with kidney disease, LVH and heart function, stroke. She agreed to take medication. She was reluctant because of her sensitivities to medication. We are going to start lowest dose of losartan in the PM. She will return in 2 weeks for repeat BP and BMP.   She is agreeable. She will call with any concerns.     She is going to return beginning of the year she is due for PPSV23.   She will also look into getting shingrix at her pharmacy. She does not like the idea of getting shot at pharmacy. Feels like should be done at the doctor's office.

## 2020-12-01 ENCOUNTER — TELEPHONE (OUTPATIENT)
Dept: FAMILY MEDICINE CLINIC | Facility: CLINIC | Age: 70
End: 2020-12-01

## 2020-12-01 NOTE — TELEPHONE ENCOUNTER
Pt called office today stating the she was placed on a new BP new losartan that contains K+. She currently takes a mulit V that has 80 mg of K+. Patient would like to know if its still ok to start the losartan due to the potassium. Please advise.

## 2020-12-01 NOTE — TELEPHONE ENCOUNTER
Yes ok to take. One of things we will do when she comes in for her BP check is check her potassium. I did not discuss that with her but this is standard to check in on after starting. Thanks.

## 2021-06-18 ENCOUNTER — OFFICE VISIT (OUTPATIENT)
Dept: FAMILY MEDICINE CLINIC | Facility: CLINIC | Age: 71
End: 2021-06-18

## 2021-06-18 VITALS
OXYGEN SATURATION: 99 % | SYSTOLIC BLOOD PRESSURE: 148 MMHG | DIASTOLIC BLOOD PRESSURE: 94 MMHG | HEIGHT: 62 IN | HEART RATE: 56 BPM | WEIGHT: 223 LBS | TEMPERATURE: 96.4 F | BODY MASS INDEX: 41.04 KG/M2 | RESPIRATION RATE: 16 BRPM

## 2021-06-18 DIAGNOSIS — R42 DIZZINESS: ICD-10-CM

## 2021-06-18 DIAGNOSIS — I10 ESSENTIAL HYPERTENSION: ICD-10-CM

## 2021-06-18 PROCEDURE — 99213 OFFICE O/P EST LOW 20 MIN: CPT | Performed by: NURSE PRACTITIONER

## 2021-06-18 PROCEDURE — 90732 PPSV23 VACC 2 YRS+ SUBQ/IM: CPT | Performed by: NURSE PRACTITIONER

## 2021-06-18 PROCEDURE — G0009 ADMIN PNEUMOCOCCAL VACCINE: HCPCS | Performed by: NURSE PRACTITIONER

## 2021-06-18 RX ORDER — ATENOLOL 50 MG/1
50 TABLET ORAL DAILY
Qty: 30 TABLET | Refills: 5 | Status: SHIPPED | OUTPATIENT
Start: 2021-06-18 | End: 2021-12-14 | Stop reason: SDUPTHER

## 2021-06-18 RX ORDER — MECLIZINE HYDROCHLORIDE 25 MG/1
25 TABLET ORAL 3 TIMES DAILY PRN
Qty: 60 TABLET | Refills: 5 | Status: SHIPPED | OUTPATIENT
Start: 2021-06-18 | End: 2022-12-16 | Stop reason: SDUPTHER

## 2021-09-17 ENCOUNTER — TELEPHONE (OUTPATIENT)
Dept: FAMILY MEDICINE CLINIC | Facility: CLINIC | Age: 71
End: 2021-09-17

## 2021-09-17 ENCOUNTER — TRANSCRIBE ORDERS (OUTPATIENT)
Dept: ADMINISTRATIVE | Facility: HOSPITAL | Age: 71
End: 2021-09-17

## 2021-09-17 DIAGNOSIS — Z12.31 ENCOUNTER FOR SCREENING MAMMOGRAM FOR BREAST CANCER: Primary | ICD-10-CM

## 2021-09-17 NOTE — TELEPHONE ENCOUNTER
No HD flu vaccines at this time. Please contact and advise this patient. She can call office back in 1 week to see if our supply is in or she can obtain it at her pharmacy.

## 2021-09-17 NOTE — TELEPHONE ENCOUNTER
PATIENT WOULD LIKE TO BE SCHEDULED FOR A FLU VACCINE IF AVAILABLE, PLEASE ADVISE    PATIENT CAN BE REACHED AT  [phone]

## 2021-10-05 ENCOUNTER — FLU SHOT (OUTPATIENT)
Dept: FAMILY MEDICINE CLINIC | Facility: CLINIC | Age: 71
End: 2021-10-05

## 2021-10-05 DIAGNOSIS — Z23 NEED FOR VACCINATION: Primary | ICD-10-CM

## 2021-11-08 ENCOUNTER — HOSPITAL ENCOUNTER (OUTPATIENT)
Dept: MAMMOGRAPHY | Facility: HOSPITAL | Age: 71
Discharge: HOME | End: 2021-11-08
Admitting: NURSE PRACTITIONER

## 2021-11-08 DIAGNOSIS — Z12.31 ENCOUNTER FOR SCREENING MAMMOGRAM FOR BREAST CANCER: ICD-10-CM

## 2021-11-08 PROCEDURE — 77063 BREAST TOMOSYNTHESIS BI: CPT

## 2021-11-08 PROCEDURE — 77067 SCR MAMMO BI INCL CAD: CPT

## 2021-12-14 ENCOUNTER — OFFICE VISIT (OUTPATIENT)
Dept: FAMILY MEDICINE CLINIC | Facility: CLINIC | Age: 71
End: 2021-12-14

## 2021-12-14 VITALS
OXYGEN SATURATION: 97 % | HEART RATE: 58 BPM | DIASTOLIC BLOOD PRESSURE: 96 MMHG | WEIGHT: 221.3 LBS | SYSTOLIC BLOOD PRESSURE: 138 MMHG | HEIGHT: 62 IN | RESPIRATION RATE: 16 BRPM | BODY MASS INDEX: 40.72 KG/M2 | TEMPERATURE: 97.3 F

## 2021-12-14 DIAGNOSIS — H10.13 ALLERGIC CONJUNCTIVITIS OF BOTH EYES: ICD-10-CM

## 2021-12-14 DIAGNOSIS — R42 DIZZINESS: ICD-10-CM

## 2021-12-14 DIAGNOSIS — Z00.00 MEDICARE ANNUAL WELLNESS VISIT, SUBSEQUENT: Primary | ICD-10-CM

## 2021-12-14 DIAGNOSIS — I10 ESSENTIAL HYPERTENSION: ICD-10-CM

## 2021-12-14 DIAGNOSIS — Z13.29 SCREENING FOR THYROID DISORDER: ICD-10-CM

## 2021-12-14 DIAGNOSIS — Z13.220 SCREENING FOR LIPOID DISORDERS: ICD-10-CM

## 2021-12-14 PROCEDURE — 1160F RVW MEDS BY RX/DR IN RCRD: CPT | Performed by: NURSE PRACTITIONER

## 2021-12-14 PROCEDURE — G0439 PPPS, SUBSEQ VISIT: HCPCS | Performed by: NURSE PRACTITIONER

## 2021-12-14 PROCEDURE — 1170F FXNL STATUS ASSESSED: CPT | Performed by: NURSE PRACTITIONER

## 2021-12-14 RX ORDER — OLOPATADINE HYDROCHLORIDE 1 MG/ML
1 SOLUTION/ DROPS OPHTHALMIC 2 TIMES DAILY
Qty: 5 ML | Refills: 3 | Status: SHIPPED | OUTPATIENT
Start: 2021-12-14 | End: 2022-12-16 | Stop reason: SDUPTHER

## 2021-12-14 RX ORDER — ATENOLOL 50 MG/1
50 TABLET ORAL DAILY
Qty: 30 TABLET | Refills: 5 | Status: SHIPPED | OUTPATIENT
Start: 2021-12-14 | End: 2022-07-19

## 2021-12-14 NOTE — PROGRESS NOTES
The ABCs of the Annual Wellness Visit  Subsequent Medicare Wellness Visit    Chief Complaint   Patient presents with   • Medicare Wellness-subsequent      Subjective    History of Present Illness:  Melody Guzman is a 71 y.o. female who presents for a Subsequent Medicare Wellness Visit.      The following portions of the patient's history were reviewed and   updated as appropriate: allergies, current medications, past family history, past medical history, past social history, past surgical history and problem list.    Compared to one year ago, the patient feels her physical   health is better.    Compared to one year ago, the patient feels her mental   health is the same.    Recent Hospitalizations:  She was not admitted to the hospital during the last year.       Current Medical Providers:  Patient Care Team:  Alesha Cherry APRN as PCP - General (Family Medicine)    Outpatient Medications Prior to Visit   Medication Sig Dispense Refill   • fluconazole (DIFLUCAN) 150 MG tablet Take one tab po at onset of symptoms, repeat in 4 days x 2 3 tablet 0   • hydrOXYzine (ATARAX) 25 MG tablet Take 1 tablet by mouth 3 (Three) Times a Day As Needed for Itching. 40 tablet 2   • ketoconazole (NIZORAL) 2 % cream by Intratympanic route.     • meclizine (ANTIVERT) 25 MG tablet Take 1 tablet by mouth 3 (Three) Times a Day As Needed for Dizziness. 60 tablet 5   • promethazine-dextromethorphan (PROMETHAZINE-DM) 6.25-15 MG/5ML syrup Take 5 mL by mouth 4 (Four) Times a Day As Needed for Cough. 240 mL 0   • triamcinolone (KENALOG) 0.025 % cream Apply  topically to the appropriate area as directed 3 (Three) Times a Day. 30 g 11   • atenolol (TENORMIN) 50 MG tablet Take 1 tablet by mouth Daily. 30 tablet 5   • olopatadine (PATANOL) 0.1 % ophthalmic solution Administer 1 drop to both eyes 2 (Two) Times a Day. 5 mL 0     No facility-administered medications prior to visit.       No opioid medication identified on active  "medication list. I have reviewed chart for other potential  high risk medication/s and harmful drug interactions in the elderly.          Aspirin is not on active medication list.  Aspirin use is not indicated based on review of current medical condition/s. Risk of harm outweighs potential benefits.  .    Patient Active Problem List   Diagnosis   • Atopic rhinitis   • Gout   • Hypertension   • Other specified noninflammatory disorders of vulva and perineum   • Malignant neoplasm of endometrium (HCC)   • Morbid (severe) obesity due to excess calories (HCC)   • Vertigo   • Allergic conjunctivitis of both eyes   • Pure hypercholesterolemia   • Right knee meniscal tear     Advance Care Planning  Advance Directive is not on file.  ACP discussion was held with the patient during this visit. Patient does not have an advance directive, information provided.          Objective    Vitals:    12/14/21 1002   BP: 138/96   Pulse: 58   Resp: 16   Temp: 97.3 °F (36.3 °C)   TempSrc: Infrared   SpO2: 97%   Weight: 100 kg (221 lb 4.8 oz)   Height: 157.5 cm (62\")     BMI Readings from Last 1 Encounters:   12/14/21 40.48 kg/m²   BMI is above normal parameters. Recommendations include: nutrition counseling    Does the patient have evidence of cognitive impairment? No    Physical Exam  Vitals reviewed.   Constitutional:       General: She is not in acute distress.     Appearance: She is well-developed. She is not diaphoretic.   HENT:      Head: Normocephalic and atraumatic.      Right Ear: Tympanic membrane, ear canal and external ear normal.      Left Ear: Tympanic membrane, ear canal and external ear normal.      Nose: Nose normal.      Mouth/Throat:      Pharynx: Uvula midline. No oropharyngeal exudate.   Cardiovascular:      Rate and Rhythm: Normal rate and regular rhythm.      Heart sounds: Normal heart sounds. No murmur heard.  No friction rub. No gallop.    Pulmonary:      Effort: Pulmonary effort is normal. No respiratory " distress.      Breath sounds: Normal breath sounds. No wheezing or rales.   Abdominal:      General: Bowel sounds are normal. There is no distension.      Palpations: Abdomen is soft.      Tenderness: There is no abdominal tenderness.   Musculoskeletal:      Cervical back: Neck supple.   Skin:     General: Skin is warm and dry.   Neurological:      Mental Status: She is alert and oriented to person, place, and time.       Lab Results   Component Value Date    CHLPL 297 (H) 12/14/2021    TRIG 220 (H) 12/14/2021    HDL 47 12/14/2021     (H) 12/14/2021    VLDL 43 (H) 12/14/2021            HEALTH RISK ASSESSMENT    Smoking Status:  Social History     Tobacco Use   Smoking Status Never Smoker   Smokeless Tobacco Never Used     Alcohol Consumption:  Social History     Substance and Sexual Activity   Alcohol Use No     Fall Risk Screen:    DUANE Fall Risk Assessment was completed, and patient is at LOW risk for falls.Assessment completed on:12/14/2021    Depression Screening:  PHQ-2/PHQ-9 Depression Screening 12/14/2021   Little interest or pleasure in doing things 0   Feeling down, depressed, or hopeless 0   Trouble falling or staying asleep, or sleeping too much -   Feeling tired or having little energy -   Poor appetite or overeating -   Feeling bad about yourself - or that you are a failure or have let yourself or your family down -   Trouble concentrating on things, such as reading the newspaper or watching television -   Moving or speaking so slowly that other people could have noticed. Or the opposite - being so fidgety or restless that you have been moving around a lot more than usual -   Thoughts that you would be better off dead, or of hurting yourself in some way -   Total Score 0   If you checked off any problems, how difficult have these problems made it for you to do your work, take care of things at home, or get along with other people? -       Health Habits and Functional and Cognitive  Screening:  Functional & Cognitive Status 12/14/2021   Do you have difficulty preparing food and eating? No   Do you have difficulty bathing yourself, getting dressed or grooming yourself? No   Do you have difficulty using the toilet? No   Do you have difficulty moving around from place to place? No   Do you have trouble with steps or getting out of a bed or a chair? No   Current Diet Well Balanced Diet   Dental Exam Up to date   Eye Exam Up to date   Exercise (times per week) 4 times per week   Current Exercises Include Walking   Current Exercise Activities Include -   Do you need help using the phone?  No   Are you deaf or do you have serious difficulty hearing?  No   Do you need help with transportation? No   Do you need help shopping? No   Do you need help preparing meals?  No   Do you need help with housework?  No   Do you need help with laundry? No   Do you need help taking your medications? No   Do you need help managing money? No   Do you ever drive or ride in a car without wearing a seat belt? No   Have you felt unusual stress, anger or loneliness in the last month? No   Who do you live with? Alone   If you need help, do you have trouble finding someone available to you? No   Have you been bothered in the last four weeks by sexual problems? No   Do you have difficulty concentrating, remembering or making decisions? No       Age-appropriate Screening Schedule:  Refer to the list below for future screening recommendations based on patient's age, sex and/or medical conditions. Orders for these recommended tests are listed in the plan section. The patient has been provided with a written plan.    Health Maintenance   Topic Date Due   • ZOSTER VACCINE (2 of 3) 12/10/2013   • DXA SCAN  10/03/2016   • LIPID PANEL  12/14/2022   • MAMMOGRAM  11/08/2023   • PAP SMEAR  12/08/2023   • TDAP/TD VACCINES (4 - Td or Tdap) 08/10/2025   • INFLUENZA VACCINE  Completed              Assessment/Plan   CMS Preventative Services  Quick Reference  Risk Factors Identified During Encounter  Immunizations Discussed/Encouraged (specific Immunizations; Influenza, Shingrix and COVID19  Inactivity/Sedentary  Obesity/Overweight   The above risks/problems have been discussed with the patient.  Follow up actions/plans if indicated are seen below in the Assessment/Plan Section.  Pertinent information has been shared with the patient in the After Visit Summary.    Diagnoses and all orders for this visit:    1. Medicare annual wellness visit, subsequent (Primary)  -     CBC & Differential  -     Comprehensive Metabolic Panel    2. Allergic conjunctivitis of both eyes  -     olopatadine (PATANOL) 0.1 % ophthalmic solution; Administer 1 drop to both eyes 2 (Two) Times a Day.  Dispense: 5 mL; Refill: 3    3. Essential hypertension  -     atenolol (TENORMIN) 50 MG tablet; Take 1 tablet by mouth Daily.  Dispense: 30 tablet; Refill: 5  -     CBC & Differential  -     Comprehensive Metabolic Panel    4. Dizziness    5. Screening for lipoid disorders  -     Lipid Panel With LDL / HDL Ratio    6. Screening for thyroid disorder  -     TSH Rfx On Abnormal To Free T4        Follow Up:   Return in about 1 year (around 12/14/2022).     An After Visit Summary and PPPS were made available to the patient.               Doing well, bp is improved from previous visit  Discussed diet/exercise to help promote weight loss  She was walking previously, recommend increasing physical activity    Labs today  Refills as needed  Follow up in six months, sooner as needed

## 2021-12-15 LAB
ALBUMIN SERPL-MCNC: 4.4 G/DL (ref 3.8–4.8)
ALBUMIN/GLOB SERPL: 1.4 {RATIO} (ref 1.2–2.2)
ALP SERPL-CCNC: 65 IU/L (ref 44–121)
ALT SERPL-CCNC: 12 IU/L (ref 0–32)
AST SERPL-CCNC: 21 IU/L (ref 0–40)
BASOPHILS # BLD AUTO: 0 X10E3/UL (ref 0–0.2)
BASOPHILS NFR BLD AUTO: 1 %
BILIRUB SERPL-MCNC: 0.6 MG/DL (ref 0–1.2)
BUN SERPL-MCNC: 12 MG/DL (ref 8–27)
BUN/CREAT SERPL: 13 (ref 12–28)
CALCIUM SERPL-MCNC: 9.9 MG/DL (ref 8.7–10.3)
CHLORIDE SERPL-SCNC: 102 MMOL/L (ref 96–106)
CHOLEST SERPL-MCNC: 297 MG/DL (ref 100–199)
CO2 SERPL-SCNC: 22 MMOL/L (ref 20–29)
CREAT SERPL-MCNC: 0.96 MG/DL (ref 0.57–1)
EOSINOPHIL # BLD AUTO: 0.2 X10E3/UL (ref 0–0.4)
EOSINOPHIL NFR BLD AUTO: 3 %
ERYTHROCYTE [DISTWIDTH] IN BLOOD BY AUTOMATED COUNT: 13.3 % (ref 11.7–15.4)
GLOBULIN SER CALC-MCNC: 3.2 G/DL (ref 1.5–4.5)
GLUCOSE SERPL-MCNC: 103 MG/DL (ref 65–99)
HCT VFR BLD AUTO: 41.7 % (ref 34–46.6)
HDLC SERPL-MCNC: 47 MG/DL
HGB BLD-MCNC: 14 G/DL (ref 11.1–15.9)
IMM GRANULOCYTES # BLD AUTO: 0 X10E3/UL (ref 0–0.1)
IMM GRANULOCYTES NFR BLD AUTO: 0 %
LDLC SERPL CALC-MCNC: 207 MG/DL (ref 0–99)
LDLC/HDLC SERPL: 4.4 RATIO (ref 0–3.2)
LYMPHOCYTES # BLD AUTO: 2.9 X10E3/UL (ref 0.7–3.1)
LYMPHOCYTES NFR BLD AUTO: 39 %
MCH RBC QN AUTO: 28.1 PG (ref 26.6–33)
MCHC RBC AUTO-ENTMCNC: 33.6 G/DL (ref 31.5–35.7)
MCV RBC AUTO: 84 FL (ref 79–97)
MONOCYTES # BLD AUTO: 0.7 X10E3/UL (ref 0.1–0.9)
MONOCYTES NFR BLD AUTO: 9 %
NEUTROPHILS # BLD AUTO: 3.6 X10E3/UL (ref 1.4–7)
NEUTROPHILS NFR BLD AUTO: 48 %
PLATELET # BLD AUTO: 297 X10E3/UL (ref 150–450)
POTASSIUM SERPL-SCNC: 4.7 MMOL/L (ref 3.5–5.2)
PROT SERPL-MCNC: 7.6 G/DL (ref 6–8.5)
RBC # BLD AUTO: 4.98 X10E6/UL (ref 3.77–5.28)
SODIUM SERPL-SCNC: 139 MMOL/L (ref 134–144)
TRIGL SERPL-MCNC: 220 MG/DL (ref 0–149)
TSH SERPL DL<=0.005 MIU/L-ACNC: 2.11 UIU/ML (ref 0.45–4.5)
VLDLC SERPL CALC-MCNC: 43 MG/DL (ref 5–40)
WBC # BLD AUTO: 7.4 X10E3/UL (ref 3.4–10.8)

## 2021-12-21 RX ORDER — ATORVASTATIN CALCIUM 20 MG/1
20 TABLET, FILM COATED ORAL DAILY
Qty: 90 TABLET | Refills: 0 | Status: SHIPPED | OUTPATIENT
Start: 2021-12-21 | End: 2022-12-16

## 2022-07-18 DIAGNOSIS — I10 ESSENTIAL HYPERTENSION: ICD-10-CM

## 2022-07-18 NOTE — TELEPHONE ENCOUNTER
Rx Refill Note  Requested Prescriptions     Pending Prescriptions Disp Refills   • atenolol (TENORMIN) 50 MG tablet [Pharmacy Med Name: atenolol 50 mg tablet] 30 tablet 5     Sig: TAKE (1) TABLET DAILY      Last office visit with prescribing clinician: 12/14/2021      Next office visit with prescribing clinician: Visit date not found            Jane Brandon MA  07/18/22, 11:58 EDT

## 2022-07-19 RX ORDER — ATENOLOL 50 MG/1
TABLET ORAL
Qty: 30 TABLET | Refills: 5 | Status: SHIPPED | OUTPATIENT
Start: 2022-07-19 | End: 2022-12-16 | Stop reason: SDUPTHER

## 2022-09-20 ENCOUNTER — TRANSCRIBE ORDERS (OUTPATIENT)
Dept: ADMINISTRATIVE | Facility: HOSPITAL | Age: 72
End: 2022-09-20

## 2022-09-20 DIAGNOSIS — Z12.31 VISIT FOR SCREENING MAMMOGRAM: Primary | ICD-10-CM

## 2022-09-27 ENCOUNTER — TELEPHONE (OUTPATIENT)
Dept: FAMILY MEDICINE CLINIC | Facility: CLINIC | Age: 72
End: 2022-09-27

## 2022-09-27 NOTE — TELEPHONE ENCOUNTER
Pt wants to know if she is okay taking the normal flu shot or does she need the high dose- please advise.

## 2022-09-27 NOTE — TELEPHONE ENCOUNTER
Recommend the high dose flu shot, we should have this available within the week but she can always call to check or check with her pharmacy.

## 2022-10-06 ENCOUNTER — CLINICAL SUPPORT (OUTPATIENT)
Dept: FAMILY MEDICINE CLINIC | Facility: CLINIC | Age: 72
End: 2022-10-06

## 2022-10-06 DIAGNOSIS — Z23 NEED FOR VACCINATION: Primary | ICD-10-CM

## 2022-10-06 PROCEDURE — G0008 ADMIN INFLUENZA VIRUS VAC: HCPCS | Performed by: NURSE PRACTITIONER

## 2022-10-06 PROCEDURE — 90662 IIV NO PRSV INCREASED AG IM: CPT | Performed by: NURSE PRACTITIONER

## 2022-11-10 ENCOUNTER — HOSPITAL ENCOUNTER (OUTPATIENT)
Dept: MAMMOGRAPHY | Facility: HOSPITAL | Age: 72
Discharge: HOME | End: 2022-11-10
Admitting: NURSE PRACTITIONER

## 2022-11-10 DIAGNOSIS — Z12.31 VISIT FOR SCREENING MAMMOGRAM: ICD-10-CM

## 2022-11-10 PROCEDURE — 77063 BREAST TOMOSYNTHESIS BI: CPT

## 2022-11-10 PROCEDURE — 77067 SCR MAMMO BI INCL CAD: CPT

## 2022-11-11 ENCOUNTER — TELEPHONE (OUTPATIENT)
Dept: FAMILY MEDICINE CLINIC | Facility: CLINIC | Age: 72
End: 2022-11-11

## 2022-11-11 NOTE — TELEPHONE ENCOUNTER
Caller: Melody Guzman    Relationship: Self    Best call back number: [phone]    What is the best time to reach you: ANYTIME    Who are you requesting to speak with (clinical staff, provider,  specific staff member): CORIE    Do you know the name of the person who called: CORIE    What was the call regarding: UNSURE.     Do you require a callback: YES

## 2022-12-16 ENCOUNTER — OFFICE VISIT (OUTPATIENT)
Dept: FAMILY MEDICINE CLINIC | Facility: CLINIC | Age: 72
End: 2022-12-16

## 2022-12-16 VITALS
RESPIRATION RATE: 18 BRPM | OXYGEN SATURATION: 97 % | HEART RATE: 68 BPM | BODY MASS INDEX: 42.64 KG/M2 | WEIGHT: 231.7 LBS | HEIGHT: 62 IN | SYSTOLIC BLOOD PRESSURE: 138 MMHG | TEMPERATURE: 97 F | DIASTOLIC BLOOD PRESSURE: 84 MMHG

## 2022-12-16 DIAGNOSIS — Z13.220 SCREENING FOR LIPOID DISORDERS: ICD-10-CM

## 2022-12-16 DIAGNOSIS — I10 ESSENTIAL HYPERTENSION: ICD-10-CM

## 2022-12-16 DIAGNOSIS — Z00.00 MEDICARE ANNUAL WELLNESS VISIT, SUBSEQUENT: Primary | ICD-10-CM

## 2022-12-16 DIAGNOSIS — Z78.0 POSTMENOPAUSAL: ICD-10-CM

## 2022-12-16 DIAGNOSIS — R42 DIZZINESS: ICD-10-CM

## 2022-12-16 DIAGNOSIS — Z13.29 SCREENING FOR THYROID DISORDER: ICD-10-CM

## 2022-12-16 DIAGNOSIS — H10.13 ALLERGIC CONJUNCTIVITIS OF BOTH EYES: ICD-10-CM

## 2022-12-16 PROCEDURE — 1170F FXNL STATUS ASSESSED: CPT | Performed by: NURSE PRACTITIONER

## 2022-12-16 PROCEDURE — G0439 PPPS, SUBSEQ VISIT: HCPCS | Performed by: NURSE PRACTITIONER

## 2022-12-16 PROCEDURE — 1160F RVW MEDS BY RX/DR IN RCRD: CPT | Performed by: NURSE PRACTITIONER

## 2022-12-16 RX ORDER — DEXTROMETHORPHAN HYDROBROMIDE AND PROMETHAZINE HYDROCHLORIDE 15; 6.25 MG/5ML; MG/5ML
5 SYRUP ORAL 4 TIMES DAILY PRN
Qty: 240 ML | Refills: 0 | Status: SHIPPED | OUTPATIENT
Start: 2022-12-16

## 2022-12-16 RX ORDER — OLOPATADINE HYDROCHLORIDE 1 MG/ML
1 SOLUTION/ DROPS OPHTHALMIC 2 TIMES DAILY
Qty: 5 ML | Refills: 11 | Status: SHIPPED | OUTPATIENT
Start: 2022-12-16

## 2022-12-16 RX ORDER — NYSTATIN 100000 [USP'U]/G
POWDER TOPICAL
COMMUNITY
Start: 2022-11-30

## 2022-12-16 RX ORDER — MECLIZINE HYDROCHLORIDE 25 MG/1
25 TABLET ORAL 3 TIMES DAILY PRN
Qty: 60 TABLET | Refills: 5 | Status: SHIPPED | OUTPATIENT
Start: 2022-12-16

## 2022-12-16 RX ORDER — PREDNISONE 10 MG/1
TABLET ORAL
COMMUNITY
Start: 2022-12-02

## 2022-12-16 RX ORDER — ATENOLOL 50 MG/1
50 TABLET ORAL DAILY
Qty: 30 TABLET | Refills: 11 | Status: SHIPPED | OUTPATIENT
Start: 2022-12-16

## 2023-02-10 ENCOUNTER — TELEPHONE (OUTPATIENT)
Dept: FAMILY MEDICINE CLINIC | Facility: CLINIC | Age: 73
End: 2023-02-10
Payer: MEDICARE

## 2023-06-02 ENCOUNTER — TELEPHONE (OUTPATIENT)
Dept: FAMILY MEDICINE CLINIC | Facility: CLINIC | Age: 73
End: 2023-06-02

## 2023-06-02 RX ORDER — INDOMETHACIN 50 MG/1
50 CAPSULE ORAL
Qty: 15 CAPSULE | Refills: 0 | Status: SHIPPED | OUTPATIENT
Start: 2023-06-02

## 2023-06-02 NOTE — TELEPHONE ENCOUNTER
RX sent to pharamcy, increase water intake to flush kidneys, no recent kidney function, will need labs in future

## 2023-06-02 NOTE — TELEPHONE ENCOUNTER
Caller: Melody Guzman    Relationship: Self    Best call back number: [phone]      What was the call regarding: PATIENT IS CALLING TO SPEAK TO WILLIAM MTZ OR HER MA TO REQUEST A MEDICATION TO HELP WITH A GOUT FLARE UP. THE MEDICATION IS CALLED INDOMETHACIN 50MG CAPSULE. PATIENT STATES SHE ONLY NEEDS A 3-4 DAY SUPPLY    PHARMACY; [facility] [place], [place] - [address] [place] [address] - [phone] [facility] [phone] FX

## 2023-08-16 ENCOUNTER — TRANSCRIBE ORDERS (OUTPATIENT)
Dept: ADMINISTRATIVE | Facility: HOSPITAL | Age: 73
End: 2023-08-16
Payer: MEDICARE

## 2023-08-16 DIAGNOSIS — Z12.31 SCREENING MAMMOGRAM FOR BREAST CANCER: Primary | ICD-10-CM

## 2023-08-17 ENCOUNTER — OFFICE VISIT (OUTPATIENT)
Dept: FAMILY MEDICINE CLINIC | Facility: CLINIC | Age: 73
End: 2023-08-17
Payer: MEDICARE

## 2023-08-17 VITALS
SYSTOLIC BLOOD PRESSURE: 124 MMHG | HEART RATE: 91 BPM | OXYGEN SATURATION: 99 % | RESPIRATION RATE: 18 BRPM | TEMPERATURE: 97.3 F | DIASTOLIC BLOOD PRESSURE: 80 MMHG | HEIGHT: 62 IN | BODY MASS INDEX: 40.8 KG/M2 | WEIGHT: 221.7 LBS

## 2023-08-17 DIAGNOSIS — L30.4 INTERTRIGO: Primary | ICD-10-CM

## 2023-08-17 PROCEDURE — 3079F DIAST BP 80-89 MM HG: CPT | Performed by: NURSE PRACTITIONER

## 2023-08-17 PROCEDURE — 99213 OFFICE O/P EST LOW 20 MIN: CPT | Performed by: NURSE PRACTITIONER

## 2023-08-17 PROCEDURE — 3074F SYST BP LT 130 MM HG: CPT | Performed by: NURSE PRACTITIONER

## 2023-08-17 RX ORDER — FLUCONAZOLE 150 MG/1
TABLET ORAL
COMMUNITY
Start: 2023-06-02 | End: 2023-08-17 | Stop reason: SDUPTHER

## 2023-08-17 RX ORDER — NYSTATIN 100000 [USP'U]/G
POWDER TOPICAL 2 TIMES DAILY
Qty: 60 G | Refills: 3 | Status: SHIPPED | OUTPATIENT
Start: 2023-08-17

## 2023-08-17 RX ORDER — NYSTATIN AND TRIAMCINOLONE ACETONIDE 100000; 1 [USP'U]/G; MG/G
1 OINTMENT TOPICAL 2 TIMES DAILY
Qty: 60 G | Refills: 2 | Status: SHIPPED | OUTPATIENT
Start: 2023-08-17

## 2023-08-17 RX ORDER — FLUCONAZOLE 150 MG/1
150 TABLET ORAL
Qty: 3 TABLET | Refills: 2 | Status: SHIPPED | OUTPATIENT
Start: 2023-08-17

## 2023-08-17 NOTE — PROGRESS NOTES
"Chief Complaint  Rash (Left  upper thigh /wound x few days/Started on right side, had nystatin powder 2x days rt cleared up. Left spread. Red.)    Subjective        Melody Guzman presents to [facility]  History of Present Illness    Melody Guzman is a 73-year-old female who presents today for a rash on her left thigh.    The patient reports she is doing well and states the rash started 4 days ago. She notes it was red, and it is the same size and reports she used nystatin powder for 2 to 3 days, and it cleared up. She adds when she took a shower the other day, it burned and notes when she put the wash around it, she may have scratched it.     She states yesterday when she got up, she could tell it was wet and notes she had used triamcinolone ointment last year. The patient adds she was told to quit using it and use the powder. She confirms she does not like the sticky feeling and states she usually wears tan underwear, and she got a darker color, and she pulled it out. She notes she is prone to having these rashes. The patient reports occasionally, it itches her vaginal side and states she cannot tell the difference between the yeast and irritation. She confirms she needs a refill for the nystatin powder.    The patient reports she is having difficulty getting an appointment with her gynecologists as they are always booked full. She notes they have been pushing her appointments back over the last several years. She adds she has been advised to use Desitin when she has irritation and confirms she uses it when she has some redness present.    Objective   Vital Signs:  /80 (BP Location: Left arm, Patient Position: Sitting, Cuff Size: Adult)   Pulse 91   Temp 97.3 øF (36.3 øC) (Temporal)   Resp 18   Ht 157.5 cm (62.01\")   Wt 101 kg (221 lb 11.2 oz)   SpO2 99%   BMI 40.54 kg/mý   Estimated body mass index is 40.54 kg/mý as calculated from the following:    Height as of this " "encounter: 157.5 cm (62.01\").    Weight as of this encounter: 101 kg (221 lb 11.2 oz).          Physical Exam  Vitals reviewed.   Constitutional:       Appearance: Normal appearance.   Cardiovascular:      Rate and Rhythm: Normal rate and regular rhythm.      Heart sounds: No murmur heard.    No friction rub. No gallop.   Pulmonary:      Effort: Pulmonary effort is normal. No respiratory distress.      Breath sounds: Normal breath sounds. No wheezing, rhonchi or rales.   Skin:     General: Skin is warm and dry.      Findings: Rash (under abdominal folds, erythematous, moist,) present.   Neurological:      Mental Status: She is alert and oriented to person, place, and time.      Result Review :                  Assessment and Plan   Diagnoses and all orders for this visit:    1. Intertrigo (Primary)  -     fluconazole (DIFLUCAN) 150 MG tablet; Take 1 tablet by mouth Every 3 (Three) Days.  Dispense: 3 tablet; Refill: 2  -     nystatin-triamcinolone (MYCOLOG) 037262-9.1 UNIT/GM-% ointment; Apply 1 application  topically to the appropriate area as directed 2 (Two) Times a Day.  Dispense: 60 g; Refill: 2  -     nystatin 463159 UNIT/GM powder; Apply  topically to the appropriate area as directed 2 (Two) Times a Day.  Dispense: 60 g; Refill: 3      General health maintenance  - The patient presents today for an acute visit for dermatitis on her left thigh.    1. Candidiasis of vulva  - A prescription for Diflucan 150 mg has been sent to the patient's pharmacy.  - A prescription for nystatin-triamcinolone cream and nystatin powder has been sent to the patient's pharmacy.  - She was advised to keep the area dry.       Follow Up   No follow-ups on file.  Patient was given instructions and counseling regarding her condition or for health maintenance advice. Please see specific information pulled into the AVS if appropriate.       Transcribed from ambient dictation for ZOYA Velazquez by Lalit James.  08/17/23   10:44 " EDT    Patient or patient representative verbalized consent to the visit recording.  I have personally performed the services described in this document as transcribed by the above individual, and it is both accurate and complete.

## 2023-09-29 ENCOUNTER — TELEPHONE (OUTPATIENT)
Dept: FAMILY MEDICINE CLINIC | Facility: CLINIC | Age: 73
End: 2023-09-29
Payer: MEDICARE

## 2023-09-29 NOTE — TELEPHONE ENCOUNTER
PATIENT CALLING TO REPORT SORE THROAT WITH WHITE DOTS. PATIENT INFORMED THAT THE OFFICE IS FULLY BOOKED FOR THE DAY AND THAT WE WOULD RECOMMEND GOING TO URGENT CARE TO BE SEEN. PATIENT STATES SHE DOES NOT WISH TO GO TO URGENT CARE AS SHE HAS HAD POOR EXPERIENCES IN THE PAST AND THEY DO NOT KNOW HER MEDICAL HISTORY. PATIENT ADVISED WE HAVE OPENINGS ON MONDAY WITH OTHER PROVIDERS IN THE OFFICE. PATIENT REQUESTING A MESSAGE BE SENT TO [facility] ASKING TO BE WORKED IN TODAY. I INFORMED HER WILL[facility] IS FULLY BOOKED AND MAY NOT SEE THE MESSAGE TODAY. PATIENT REQUESTS A NOTE BE SENT OVER ANYWAY.    PLEASE ADVISE.

## 2023-10-10 ENCOUNTER — FLU SHOT (OUTPATIENT)
Dept: FAMILY MEDICINE CLINIC | Facility: CLINIC | Age: 73
End: 2023-10-10
Payer: MEDICARE

## 2023-10-10 DIAGNOSIS — Z23 NEED FOR IMMUNIZATION AGAINST INFLUENZA: Primary | ICD-10-CM

## 2023-11-14 ENCOUNTER — HOSPITAL ENCOUNTER (OUTPATIENT)
Dept: MAMMOGRAPHY | Facility: HOSPITAL | Age: 73
Discharge: HOME | End: 2023-11-14
Admitting: NURSE PRACTITIONER
Payer: MEDICARE

## 2023-11-14 DIAGNOSIS — Z12.31 SCREENING MAMMOGRAM FOR BREAST CANCER: ICD-10-CM

## 2023-11-14 PROCEDURE — 77067 SCR MAMMO BI INCL CAD: CPT

## 2023-11-14 PROCEDURE — 77063 BREAST TOMOSYNTHESIS BI: CPT

## 2023-12-19 ENCOUNTER — OFFICE VISIT (OUTPATIENT)
Dept: FAMILY MEDICINE CLINIC | Facility: CLINIC | Age: 73
End: 2023-12-19
Payer: MEDICARE

## 2023-12-19 VITALS
BODY MASS INDEX: 39.6 KG/M2 | WEIGHT: 215.2 LBS | SYSTOLIC BLOOD PRESSURE: 130 MMHG | RESPIRATION RATE: 18 BRPM | HEIGHT: 62 IN | TEMPERATURE: 98 F | OXYGEN SATURATION: 98 % | HEART RATE: 78 BPM | DIASTOLIC BLOOD PRESSURE: 80 MMHG

## 2023-12-19 DIAGNOSIS — H10.13 ALLERGIC CONJUNCTIVITIS OF BOTH EYES: ICD-10-CM

## 2023-12-19 DIAGNOSIS — Z13.220 SCREENING FOR LIPOID DISORDERS: ICD-10-CM

## 2023-12-19 DIAGNOSIS — Z11.59 ENCOUNTER FOR HEPATITIS C SCREENING TEST FOR LOW RISK PATIENT: ICD-10-CM

## 2023-12-19 DIAGNOSIS — I10 ESSENTIAL HYPERTENSION: ICD-10-CM

## 2023-12-19 DIAGNOSIS — Z00.00 MEDICARE ANNUAL WELLNESS VISIT, SUBSEQUENT: Primary | ICD-10-CM

## 2023-12-19 DIAGNOSIS — Z82.49 FAMILY HISTORY OF CAROTID ARTERY STENOSIS: ICD-10-CM

## 2023-12-19 DIAGNOSIS — Z13.6 ENCOUNTER FOR SCREENING FOR STENOSIS OF CAROTID ARTERY: ICD-10-CM

## 2023-12-19 DIAGNOSIS — R42 DIZZINESS: ICD-10-CM

## 2023-12-19 DIAGNOSIS — L30.4 INTERTRIGO: ICD-10-CM

## 2023-12-19 PROCEDURE — 3079F DIAST BP 80-89 MM HG: CPT | Performed by: NURSE PRACTITIONER

## 2023-12-19 PROCEDURE — 3075F SYST BP GE 130 - 139MM HG: CPT | Performed by: NURSE PRACTITIONER

## 2023-12-19 PROCEDURE — G0439 PPPS, SUBSEQ VISIT: HCPCS | Performed by: NURSE PRACTITIONER

## 2023-12-19 PROCEDURE — 1170F FXNL STATUS ASSESSED: CPT | Performed by: NURSE PRACTITIONER

## 2023-12-19 RX ORDER — ATENOLOL 50 MG/1
50 TABLET ORAL DAILY
Qty: 90 TABLET | Refills: 3 | Status: SHIPPED | OUTPATIENT
Start: 2023-12-19

## 2023-12-19 RX ORDER — FLUCONAZOLE 150 MG/1
150 TABLET ORAL
Qty: 3 TABLET | Refills: 2 | Status: SHIPPED | OUTPATIENT
Start: 2023-12-19

## 2023-12-19 RX ORDER — MECLIZINE HYDROCHLORIDE 25 MG/1
25 TABLET ORAL 3 TIMES DAILY PRN
Qty: 60 TABLET | Refills: 5 | Status: SHIPPED | OUTPATIENT
Start: 2023-12-19

## 2023-12-19 RX ORDER — OLOPATADINE HYDROCHLORIDE 1 MG/ML
1 SOLUTION/ DROPS OPHTHALMIC 2 TIMES DAILY
Qty: 5 ML | Refills: 11 | Status: SHIPPED | OUTPATIENT
Start: 2023-12-19

## 2023-12-19 NOTE — PROGRESS NOTES
The ABCs of the Annual Wellness Visit  Subsequent Medicare Wellness Visit    Subjective      Melody Guzman is a 73 y.o. female who presents for a Subsequent Medicare Wellness Visit.    The following portions of the patient's history were reviewed and   updated as appropriate: allergies, current medications, past family history, past medical history, past social history, past surgical history, and problem list.    Compared to one year ago, the patient feels her physical   health is better.    Compared to one year ago, the patient feels her mental   health is the same.    Recent Hospitalizations:  She was not admitted to the hospital during the last year.       Current Medical Providers:  Patient Care Team:  Alesha Cherry APRN as PCP - General (Family Medicine)    Outpatient Medications Prior to Visit   Medication Sig Dispense Refill    hydrOXYzine (ATARAX) 25 MG tablet Take 1 tablet by mouth 3 (Three) Times a Day As Needed for Itching. 40 tablet 2    indomethacin (INDOCIN) 50 MG capsule Take 1 capsule by mouth 3 (Three) Times a Day With Meals. 15 capsule 0    ketoconazole (NIZORAL) 2 % cream by Intratympanic route.      nystatin 040164 UNIT/GM powder Apply  topically to the appropriate area as directed 2 (Two) Times a Day. 60 g 3    nystatin-triamcinolone (MYCOLOG) 471105-8.1 UNIT/GM-% ointment Apply 1 application  topically to the appropriate area as directed 2 (Two) Times a Day. 60 g 2    promethazine-dextromethorphan (PROMETHAZINE-DM) 6.25-15 MG/5ML syrup Take 5 mL by mouth 4 (Four) Times a Day As Needed for Cough. 240 mL 0    triamcinolone (KENALOG) 0.025 % cream Apply  topically to the appropriate area as directed 3 (Three) Times a Day. 30 g 11    atenolol (TENORMIN) 50 MG tablet Take 1 tablet by mouth Daily. 30 tablet 11    fluconazole (DIFLUCAN) 150 MG tablet Take 1 tablet by mouth Every 3 (Three) Days. 3 tablet 2    meclizine (ANTIVERT) 25 MG tablet Take 1 tablet by mouth 3 (Three) Times a Day  "As Needed for Dizziness. 60 tablet 5    olopatadine (PATANOL) 0.1 % ophthalmic solution Administer 1 drop to both eyes 2 (Two) Times a Day. 5 mL 11     No facility-administered medications prior to visit.       No opioid medication identified on active medication list. I have reviewed chart for other potential  high risk medication/s and harmful drug interactions in the elderly.        Aspirin is not on active medication list.  Aspirin use is not indicated based on review of current medical condition/s. Risk of harm outweighs potential benefits.  .    Patient Active Problem List   Diagnosis    Atopic rhinitis    Gout    Hypertension    Other specified noninflammatory disorders of vulva and perineum    Malignant neoplasm of endometrium    Morbid (severe) obesity due to excess calories    Vertigo    Allergic conjunctivitis of both eyes    Pure hypercholesterolemia    Right knee meniscal tear     Advance Care Planning   Advance Care Planning     Advance Directive is not on file.  ACP discussion was held with the patient during this visit. Patient does not have an advance directive, information provided.     Objective    Vitals:    12/19/23 1029   BP: 130/80   BP Location: Left arm   Patient Position: Sitting   Cuff Size: Adult   Pulse: 78   Resp: 18   Temp: 98 °F (36.7 °C)   TempSrc: Temporal   SpO2: 98%   Weight: 97.6 kg (215 lb 3.2 oz)   Height: 157.5 cm (62.01\")     Estimated body mass index is 39.35 kg/m² as calculated from the following:    Height as of this encounter: 157.5 cm (62.01\").    Weight as of this encounter: 97.6 kg (215 lb 3.2 oz).    Class 3 Severe Obesity (BMI >=40). Obesity-related health conditions include the following: hypertension. Obesity is unchanged. BMI is is above average; BMI management plan is completed. We discussed portion control and increasing exercise.      Does the patient have evidence of cognitive impairment?   No            HEALTH RISK ASSESSMENT    Smoking Status:  Social " History     Tobacco Use   Smoking Status Never   Smokeless Tobacco Never     Alcohol Consumption:  Social History     Substance and Sexual Activity   Alcohol Use No     Fall Risk Screen:    MYCHALADI Fall Risk Assessment was completed, and patient is at LOW risk for falls.Assessment completed on:2023    Depression Screenin/19/2023    10:27 AM   PHQ-2/PHQ-9 Depression Screening   Little Interest or Pleasure in Doing Things 0-->not at all   Feeling Down, Depressed or Hopeless 0-->not at all   PHQ-9: Brief Depression Severity Measure Score 0       Health Habits and Functional and Cognitive Screenin/19/2023    10:27 AM   Functional & Cognitive Status   Do you have difficulty preparing food and eating? No   Do you have difficulty bathing yourself, getting dressed or grooming yourself? No   Do you have difficulty using the toilet? No   Do you have difficulty moving around from place to place? No   Do you have trouble with steps or getting out of a bed or a chair? No   Current Diet Well Balanced Diet   Dental Exam Up to date   Eye Exam Up to date   Exercise (times per week) 7 times per week   Current Exercises Include Aerobics   Do you need help using the phone?  No   Are you deaf or do you have serious difficulty hearing?  No   Do you need help to go to places out of walking distance? No   Do you need help shopping? No   Do you need help preparing meals?  No   Do you need help with housework?  No   Do you need help with laundry? No   Do you need help taking your medications? No   Do you need help managing money? No   Do you ever drive or ride in a car without wearing a seat belt? No   Have you felt unusual stress, anger or loneliness in the last month? No   Who do you live with? Alone   If you need help, do you have trouble finding someone available to you? No   Have you been bothered in the last four weeks by sexual problems? No   Do you have difficulty concentrating, remembering or making  decisions? No       Age-appropriate Screening Schedule:  Refer to the list below for future screening recommendations based on patient's age, sex and/or medical conditions. Orders for these recommended tests are listed in the plan section. The patient has been provided with a written plan.    Health Maintenance   Topic Date Due    HEPATITIS C SCREENING  Never done    LIPID PANEL  12/14/2022    COVID-19 Vaccine (4 - 2023-24 season) 11/25/2024 (Originally 9/1/2023)    ZOSTER VACCINE (2 of 3) 11/25/2024 (Originally 12/10/2013)    ANNUAL WELLNESS VISIT  12/19/2024    BMI FOLLOWUP  12/19/2024    DXA SCAN  07/06/2025    TDAP/TD VACCINES (4 - Td or Tdap) 08/10/2025    MAMMOGRAM  11/14/2025    PAP SMEAR  01/10/2026    COLORECTAL CANCER SCREENING  06/05/2028    INFLUENZA VACCINE  Completed    Pneumococcal Vaccine 65+  Completed              Physical Exam  Vitals reviewed.   Constitutional:       General: She is not in acute distress.     Appearance: She is well-developed. She is not diaphoretic.   HENT:      Head: Normocephalic and atraumatic.      Right Ear: Tympanic membrane, ear canal and external ear normal.      Left Ear: Tympanic membrane, ear canal and external ear normal.      Nose: Nose normal.      Mouth/Throat:      Mouth: Mucous membranes are moist.      Pharynx: Oropharynx is clear. Uvula midline. No oropharyngeal exudate.   Eyes:      Conjunctiva/sclera: Conjunctivae normal.      Pupils: Pupils are equal, round, and reactive to light.   Cardiovascular:      Rate and Rhythm: Normal rate and regular rhythm.      Heart sounds: Normal heart sounds. No murmur heard.     No friction rub. No gallop.   Pulmonary:      Effort: Pulmonary effort is normal. No respiratory distress.      Breath sounds: Normal breath sounds. No wheezing or rales.   Abdominal:      General: Bowel sounds are normal. There is no distension.      Palpations: Abdomen is soft.      Tenderness: There is no abdominal tenderness.   Musculoskeletal:       Cervical back: Neck supple.   Lymphadenopathy:      Cervical: No cervical adenopathy.   Skin:     General: Skin is warm and dry.   Neurological:      Mental Status: She is alert and oriented to person, place, and time.   Psychiatric:         Mood and Affect: Mood normal.           CMS Preventative Services Quick Reference  Risk Factors Identified During Encounter:    Immunizations Discussed/Encouraged: RSV (Respiratory Syncytial Virus)    The above risks/problems have been discussed with the patient.  Pertinent information has been shared with the patient in the After Visit Summary.    Diagnoses and all orders for this visit:    1. Medicare annual wellness visit, subsequent (Primary)  -     CBC & Differential  -     Comprehensive Metabolic Panel  -     Lipid Panel With LDL / HDL Ratio  -     TSH Rfx On Abnormal To Free T4    2. Essential hypertension  -     CBC & Differential  -     Comprehensive Metabolic Panel  -     TSH Rfx On Abnormal To Free T4  -     atenolol (TENORMIN) 50 MG tablet; Take 1 tablet by mouth Daily.  Dispense: 90 tablet; Refill: 3    3. Screening for lipoid disorders  -     Lipid Panel With LDL / HDL Ratio    4. Encounter for hepatitis C screening test for low risk patient  -     Hepatitis C Antibody    5. Dizziness  -     meclizine (ANTIVERT) 25 MG tablet; Take 1 tablet by mouth 3 (Three) Times a Day As Needed for Dizziness.  Dispense: 60 tablet; Refill: 5    6. Allergic conjunctivitis of both eyes  -     olopatadine (PATANOL) 0.1 % ophthalmic solution; Administer 1 drop to both eyes 2 (Two) Times a Day.  Dispense: 5 mL; Refill: 11    7. Intertrigo  -     fluconazole (DIFLUCAN) 150 MG tablet; Take 1 tablet by mouth Every 3 (Three) Days.  Dispense: 3 tablet; Refill: 2    8. Family history of carotid artery stenosis  -     Vascular Screening (Carotid) CAR; Future    9. Encounter for screening for stenosis of carotid artery  -     Vascular Screening (Carotid) CAR;  Future      Information/counseling provided to the patient regarding periodic naun maintenance recommendations, including but not limited to immunizations, diet/exercise/healthy lifestyle, laboratory, and other screenings. BMI is discussed. Appropriate exercise, diet, and weight plans are discussed.    Refills given, patient doing well today with no acute concerns  Follow up in 3 months with labs for HTN    Follow Up:   Next Medicare Wellness visit to be scheduled in 1 year.      An After Visit Summary and PPPS were made available to the patient.

## 2023-12-20 ENCOUNTER — TELEPHONE (OUTPATIENT)
Dept: FAMILY MEDICINE CLINIC | Facility: CLINIC | Age: 73
End: 2023-12-20

## 2023-12-20 NOTE — TELEPHONE ENCOUNTER
Called and spoke w/Pt providing her the phone number to scheduling at [phone] to contact them just in case they have tried to reach out to her as of today since her home phone is not in service at this time.

## 2023-12-20 NOTE — TELEPHONE ENCOUNTER
Caller: Melody Guzman    Relationship: Self    Best call back number: [phone]       What was the call regarding      PATIENT WANTS YOU TO KNOW THAT HER HOME PHONE IS CURRENTLY OUT OF ORDER AND THAT YOU WILL NEED TO CALL HER ON HER CELL PHONE WHICH IS LISTED AS THE CALL BACK NUMBER.    SHE IS ALSO WANTING TO FIND OUT ABOUT HER APPOINTMENT TO GET A ULTRA SOUND OF HER [employer].  SHE IS HOPING SHE HAS NOT MISSED THE CALL.

## 2024-05-01 ENCOUNTER — OFFICE VISIT (OUTPATIENT)
Dept: FAMILY MEDICINE CLINIC | Facility: CLINIC | Age: 74
End: 2024-05-01
Payer: MEDICARE

## 2024-05-01 VITALS
BODY MASS INDEX: 38.88 KG/M2 | TEMPERATURE: 97.3 F | HEIGHT: 62 IN | DIASTOLIC BLOOD PRESSURE: 84 MMHG | RESPIRATION RATE: 18 BRPM | HEART RATE: 58 BPM | WEIGHT: 211.3 LBS | SYSTOLIC BLOOD PRESSURE: 132 MMHG | OXYGEN SATURATION: 98 %

## 2024-05-01 DIAGNOSIS — L30.4 INTERTRIGO: ICD-10-CM

## 2024-05-01 DIAGNOSIS — L30.8 OTHER ECZEMA: ICD-10-CM

## 2024-05-01 DIAGNOSIS — I10 ESSENTIAL HYPERTENSION: ICD-10-CM

## 2024-05-01 DIAGNOSIS — J01.10 ACUTE NON-RECURRENT FRONTAL SINUSITIS: Primary | ICD-10-CM

## 2024-05-01 PROCEDURE — 3075F SYST BP GE 130 - 139MM HG: CPT | Performed by: NURSE PRACTITIONER

## 2024-05-01 PROCEDURE — 99214 OFFICE O/P EST MOD 30 MIN: CPT | Performed by: NURSE PRACTITIONER

## 2024-05-01 PROCEDURE — 3079F DIAST BP 80-89 MM HG: CPT | Performed by: NURSE PRACTITIONER

## 2024-05-01 RX ORDER — FLUCONAZOLE 150 MG/1
150 TABLET ORAL
Qty: 3 TABLET | Refills: 2 | Status: SHIPPED | OUTPATIENT
Start: 2024-05-01

## 2024-05-01 RX ORDER — DOXYCYCLINE HYCLATE 100 MG/1
100 CAPSULE ORAL 2 TIMES DAILY
Qty: 14 CAPSULE | Refills: 0 | Status: SHIPPED | OUTPATIENT
Start: 2024-05-01

## 2024-05-01 RX ORDER — TRIAMCINOLONE ACETONIDE 0.25 MG/G
CREAM TOPICAL 3 TIMES DAILY
Qty: 30 G | Refills: 11 | Status: SHIPPED | OUTPATIENT
Start: 2024-05-01

## 2024-05-01 NOTE — PROGRESS NOTES
"Chief Complaint  Hypertension    Subjective        Melody Guzman presents to [facility]  History of Present Illness  History of Present Illness  The patient presents for evaluation of multiple medical concerns.    The patient began experiencing nasal congestion approximately 3 weeks ago, initially characterized by a clear nasal discharge that resembled a faucet-like texture. Subsequently, she transitioned to white and greenish mucus during the first week, which subsequently evolved into her chest, leading to a productive cough. Her condition improved towards the end of the previous week.    The patient reports redness, scaling and open skin to her right chin/cheek area. This occurred during the time of her illness. She has applied mupirocin without significant change. She does report redness and pain. She attempted to treat the area with over-the-counter Neosporin, but is hesitant to apply it on her face.     She follows up on her BP as well today which is well controlled. She continues to take atenolol 50 mg daily. She denies any side effects currently. She is agreeable to labs today.    Objective   Vital Signs:  /84 (BP Location: Left arm, Patient Position: Sitting, Cuff Size: Adult)   Pulse 58   Temp 97.3 °F (36.3 °C) (Temporal)   Resp 18   Ht 157.5 cm (62.01\")   Wt 95.8 kg (211 lb 4.8 oz)   SpO2 98%   BMI 38.64 kg/m²   Estimated body mass index is 38.64 kg/m² as calculated from the following:    Height as of this encounter: 157.5 cm (62.01\").    Weight as of this encounter: 95.8 kg (211 lb 4.8 oz).               Physical Exam  Vitals reviewed.   Constitutional:       General: She is not in acute distress.     Appearance: Normal appearance. She is well-developed. She is not diaphoretic.   Cardiovascular:      Rate and Rhythm: Normal rate and regular rhythm.      Heart sounds: Normal heart sounds. No murmur heard.     No friction rub. No gallop.   Pulmonary:      " Effort: Pulmonary effort is normal. No respiratory distress.      Breath sounds: Normal breath sounds. No wheezing or rales.   Musculoskeletal:      Cervical back: Neck supple.   Skin:     General: Skin is warm and dry.      Findings: Erythema (right chin/cheek with open lesion, no crusting present, surrounding edema and erythema) present.   Neurological:      Mental Status: She is alert and oriented to person, place, and time.       Physical Exam  Vital Signs  The patient's blood pressure is 132/84.    Result Review :          Results      Assessment & Plan  1. Sinusitis.  A prescription for doxycycline, to be taken twice daily with meals for a duration of 7 days, has been issued.    2. Cutaneous lesions.  The lesions may be indicative of a fungal infection or Staphylococcus infection. Prescriptions for mupirocin and triamcinolone cream, to be applied twice daily, have been issued. Additionally, a prescription for Diflucan has been issued, with additional refills to be taken every 3 days as needed.    3. Hypertension.  The patient's blood pressure is well-regulated. A blood work order has been placed, and the patient will be informed of the results.    Follow-up  The patient is scheduled for a follow-up visit in 6 months.           Assessment and Plan     Diagnoses and all orders for this visit:    1. Acute non-recurrent frontal sinusitis (Primary)    2. Other eczema  -     triamcinolone (KENALOG) 0.025 % cream; Apply  topically to the appropriate area as directed 3 (Three) Times a Day.  Dispense: 30 g; Refill: 11    3. Intertrigo  -     fluconazole (DIFLUCAN) 150 MG tablet; Take 1 tablet by mouth Every 3 (Three) Days.  Dispense: 3 tablet; Refill: 2    4. Essential hypertension    Other orders  -     mupirocin (BACTROBAN) 2 % ointment; Apply 1 Application topically to the appropriate area as directed 3 (Three) Times a Day.  Dispense: 22 g; Refill: 0  -     doxycycline (VIBRAMYCIN) 100 MG capsule; Take 1 capsule by  mouth 2 (Two) Times a Day.  Dispense: 14 capsule; Refill: 0             Follow Up     No follow-ups on file.  Patient was given instructions and counseling regarding her condition or for health maintenance advice. Please see specific information pulled into the AVS if appropriate.       Patient or patient representative verbalized consent for the use of Ambient Listening during the visit with  ZOYA Velazquez for chart documentation. 5/1/2024  15:00 EDT

## 2024-05-02 LAB
ALBUMIN SERPL-MCNC: 4.4 G/DL (ref 3.5–5.2)
ALBUMIN/GLOB SERPL: 1.5 G/DL
ALP SERPL-CCNC: 68 U/L (ref 39–117)
ALT SERPL-CCNC: 12 U/L (ref 1–33)
AST SERPL-CCNC: 18 U/L (ref 1–32)
BASOPHILS # BLD AUTO: 0.06 10*3/MM3 (ref 0–0.2)
BASOPHILS NFR BLD AUTO: 0.8 % (ref 0–1.5)
BILIRUB SERPL-MCNC: 0.6 MG/DL (ref 0–1.2)
BUN SERPL-MCNC: 15 MG/DL (ref 8–23)
BUN/CREAT SERPL: 16.1 (ref 7–25)
CALCIUM SERPL-MCNC: 9.9 MG/DL (ref 8.6–10.5)
CHLORIDE SERPL-SCNC: 103 MMOL/L (ref 98–107)
CHOLEST SERPL-MCNC: 301 MG/DL (ref 0–200)
CO2 SERPL-SCNC: 23.8 MMOL/L (ref 22–29)
CREAT SERPL-MCNC: 0.93 MG/DL (ref 0.57–1)
EGFRCR SERPLBLD CKD-EPI 2021: 65.4 ML/MIN/1.73
EOSINOPHIL # BLD AUTO: 0.26 10*3/MM3 (ref 0–0.4)
EOSINOPHIL NFR BLD AUTO: 3.7 % (ref 0.3–6.2)
ERYTHROCYTE [DISTWIDTH] IN BLOOD BY AUTOMATED COUNT: 13.5 % (ref 12.3–15.4)
GLOBULIN SER CALC-MCNC: 3 GM/DL
GLUCOSE SERPL-MCNC: 110 MG/DL (ref 65–99)
HCT VFR BLD AUTO: 42.5 % (ref 34–46.6)
HCV IGG SERPL QL IA: NON REACTIVE
HDLC SERPL-MCNC: 59 MG/DL (ref 40–60)
HGB BLD-MCNC: 13.9 G/DL (ref 12–15.9)
IMM GRANULOCYTES # BLD AUTO: 0.02 10*3/MM3 (ref 0–0.05)
IMM GRANULOCYTES NFR BLD AUTO: 0.3 % (ref 0–0.5)
LDLC SERPL CALC-MCNC: 216 MG/DL (ref 0–100)
LDLC/HDLC SERPL: 3.62 {RATIO}
LYMPHOCYTES # BLD AUTO: 2.77 10*3/MM3 (ref 0.7–3.1)
LYMPHOCYTES NFR BLD AUTO: 39.2 % (ref 19.6–45.3)
MCH RBC QN AUTO: 27.5 PG (ref 26.6–33)
MCHC RBC AUTO-ENTMCNC: 32.7 G/DL (ref 31.5–35.7)
MCV RBC AUTO: 84.2 FL (ref 79–97)
MONOCYTES # BLD AUTO: 0.54 10*3/MM3 (ref 0.1–0.9)
MONOCYTES NFR BLD AUTO: 7.6 % (ref 5–12)
NEUTROPHILS # BLD AUTO: 3.41 10*3/MM3 (ref 1.7–7)
NEUTROPHILS NFR BLD AUTO: 48.4 % (ref 42.7–76)
NRBC BLD AUTO-RTO: 0 /100 WBC (ref 0–0.2)
PLATELET # BLD AUTO: 265 10*3/MM3 (ref 140–450)
POTASSIUM SERPL-SCNC: 4.5 MMOL/L (ref 3.5–5.2)
PROT SERPL-MCNC: 7.4 G/DL (ref 6–8.5)
RBC # BLD AUTO: 5.05 10*6/MM3 (ref 3.77–5.28)
SODIUM SERPL-SCNC: 139 MMOL/L (ref 136–145)
TRIGL SERPL-MCNC: 142 MG/DL (ref 0–150)
TSH SERPL DL<=0.005 MIU/L-ACNC: 2.26 UIU/ML (ref 0.27–4.2)
VLDLC SERPL CALC-MCNC: 26 MG/DL (ref 5–40)
WBC # BLD AUTO: 7.06 10*3/MM3 (ref 3.4–10.8)

## 2024-08-16 ENCOUNTER — OFFICE VISIT (OUTPATIENT)
Dept: FAMILY MEDICINE CLINIC | Facility: CLINIC | Age: 74
End: 2024-08-16
Payer: MEDICARE

## 2024-08-16 VITALS
WEIGHT: 203.7 LBS | RESPIRATION RATE: 16 BRPM | BODY MASS INDEX: 37.49 KG/M2 | HEART RATE: 55 BPM | OXYGEN SATURATION: 98 % | SYSTOLIC BLOOD PRESSURE: 134 MMHG | TEMPERATURE: 98.3 F | DIASTOLIC BLOOD PRESSURE: 74 MMHG | HEIGHT: 62 IN

## 2024-08-16 DIAGNOSIS — L30.8 OTHER ECZEMA: ICD-10-CM

## 2024-08-16 PROCEDURE — 1126F AMNT PAIN NOTED NONE PRSNT: CPT | Performed by: NURSE PRACTITIONER

## 2024-08-16 PROCEDURE — 3075F SYST BP GE 130 - 139MM HG: CPT | Performed by: NURSE PRACTITIONER

## 2024-08-16 PROCEDURE — 3078F DIAST BP <80 MM HG: CPT | Performed by: NURSE PRACTITIONER

## 2024-08-16 PROCEDURE — 96372 THER/PROPH/DIAG INJ SC/IM: CPT | Performed by: NURSE PRACTITIONER

## 2024-08-16 PROCEDURE — 99213 OFFICE O/P EST LOW 20 MIN: CPT | Performed by: NURSE PRACTITIONER

## 2024-08-16 RX ORDER — METHYLPREDNISOLONE ACETATE 40 MG/ML
40 INJECTION, SUSPENSION INTRA-ARTICULAR; INTRALESIONAL; INTRAMUSCULAR; SOFT TISSUE ONCE
Status: COMPLETED | OUTPATIENT
Start: 2024-08-16 | End: 2024-08-16

## 2024-08-16 RX ORDER — TRIAMCINOLONE ACETONIDE 0.25 MG/G
CREAM TOPICAL 3 TIMES DAILY
Qty: 30 G | Refills: 3 | Status: SHIPPED | OUTPATIENT
Start: 2024-08-16

## 2024-08-16 RX ADMIN — METHYLPREDNISOLONE ACETATE 40 MG: 40 INJECTION, SUSPENSION INTRA-ARTICULAR; INTRALESIONAL; INTRAMUSCULAR; SOFT TISSUE at 14:51

## 2024-08-21 NOTE — PROGRESS NOTES
"Chief Complaint  Insect Bite (Wont' heal, getting really red, on the ankles, bridge of the nose, and by the ear. \"Look like they're starting to get infected\" not itching. Has had them 10-12 days now)    Subjective        Melody Guzman presents to [facility]  Insect Bite      History of Present Illness  The patient is a 74-year-old female who presents for evaluation of an insect bite.    She initially suspected the bite to be from a mosquito or spider but later noticed a gnat in her environment. The bite, which started small, has since enlarged. It does not cause pain or itching, but it has been growing in size.    She also reports sensations around her ankles, which she believes were caused by the gnat. Additionally, she observed some discharge on a tissue used to clean her face.    Her ophthalmologist prescribed Tobradex due to a split in her eyelid that has not healed since a previous spider bite. Despite not observing any spiders or their webs, she felt something crawling on her after opening a gate, which led to swollen eyes. She was given steroid eye drops, which provided some relief but did not fully resolve the issue. The bite became infected, leading to a split in her eyelid. She was then prescribed antibiotics and Tobradex, which seemed to help. However, she occasionally experiences eyelid drooping, which reopens the wound. She applied a small amount of Tobradex to the area, which improved its appearance.    She also reports a slightly itchy spot on her leg, which occasionally flares up with red dots. She is unsure if this is a reaction to the bite.    She recently received her allergy injections and tolerates steroids well.    Objective   Vital Signs:  /74 (BP Location: Right arm, Patient Position: Sitting, Cuff Size: Large Adult)   Pulse 55   Temp 98.3 °F (36.8 °C) (Oral)   Resp 16   Ht 157.5 cm (62.01\")   Wt 92.4 kg (203 lb 11.2 oz)   SpO2 98%   BMI 37.25 " "kg/m²   Estimated body mass index is 37.25 kg/m² as calculated from the following:    Height as of this encounter: 157.5 cm (62.01\").    Weight as of this encounter: 92.4 kg (203 lb 11.2 oz).               Physical Exam  Constitutional:       General: She is not in acute distress.     Appearance: She is well-developed. She is not diaphoretic.   Cardiovascular:      Rate and Rhythm: Normal rate and regular rhythm.      Heart sounds: Normal heart sounds. No murmur heard.     No friction rub. No gallop.   Pulmonary:      Effort: Pulmonary effort is normal. No respiratory distress.      Breath sounds: Normal breath sounds. No wheezing or rales.   Musculoskeletal:      Cervical back: Neck supple.   Skin:     General: Skin is warm and dry.      Findings: Rash (face, macular, nonvesicular) present. No lesion.   Neurological:      Mental Status: She is alert and oriented to person, place, and time.       Physical Exam      Result Review :          Results      Assessment & Plan  1. Insect bite.  The symptoms are consistent with an insect bite, with the patient reporting an initial small lesion that has grown larger. There is no pain or itching, but the area feels dry and scaly, likely due to scratching. The patient has been using Tobradex ointment, which has shown improvement. She was advised to continue using Tobradex ointment for 7 days. Prescriptions for triamcinolone and mupirocin ointments were provided, with instructions to mix a small amount of each and apply to the affected areas. A steroid injection was administered today to help with periodic flare-ups and redness. She was instructed not to use the ointments for more than 7 days.               Assessment and Plan     Diagnoses and all orders for this visit:    1. Other eczema  -     triamcinolone (KENALOG) 0.025 % cream; Apply  topically to the appropriate area as directed 3 (Three) Times a Day.  Dispense: 30 g; Refill: 3  -     methylPREDNISolone acetate " (DEPO-medrol) injection 40 mg    Other orders  -     mupirocin (BACTROBAN) 2 % ointment; Apply 1 Application topically to the appropriate area as directed 3 (Three) Times a Day.  Dispense: 22 g; Refill: 0             Follow Up     No follow-ups on file.  Patient was given instructions and counseling regarding her condition or for health maintenance advice. Please see specific information pulled into the AVS if appropriate.       Patient or patient representative verbalized consent for the use of Ambient Listening during the visit with  ZOYA Velazquez for chart documentation. 8/21/2024  18:28 EDT

## 2024-10-17 ENCOUNTER — OFFICE VISIT (OUTPATIENT)
Dept: FAMILY MEDICINE CLINIC | Facility: CLINIC | Age: 74
End: 2024-10-17
Payer: MEDICARE

## 2024-10-17 VITALS
RESPIRATION RATE: 17 BRPM | DIASTOLIC BLOOD PRESSURE: 78 MMHG | SYSTOLIC BLOOD PRESSURE: 132 MMHG | OXYGEN SATURATION: 98 % | HEIGHT: 62 IN | WEIGHT: 203 LBS | BODY MASS INDEX: 37.36 KG/M2 | HEART RATE: 67 BPM

## 2024-10-17 DIAGNOSIS — L30.9 ACUTE DERMATITIS: ICD-10-CM

## 2024-10-17 DIAGNOSIS — J02.9 SORE THROAT: Primary | ICD-10-CM

## 2024-10-17 LAB
EXPIRATION DATE: NORMAL
INTERNAL CONTROL: NORMAL
Lab: NORMAL
S PYO AG THROAT QL: NEGATIVE

## 2024-10-17 PROCEDURE — 1126F AMNT PAIN NOTED NONE PRSNT: CPT | Performed by: FAMILY MEDICINE

## 2024-10-17 PROCEDURE — 99213 OFFICE O/P EST LOW 20 MIN: CPT | Performed by: FAMILY MEDICINE

## 2024-10-17 PROCEDURE — 3075F SYST BP GE 130 - 139MM HG: CPT | Performed by: FAMILY MEDICINE

## 2024-10-17 PROCEDURE — 3078F DIAST BP <80 MM HG: CPT | Performed by: FAMILY MEDICINE

## 2024-10-17 PROCEDURE — 96372 THER/PROPH/DIAG INJ SC/IM: CPT | Performed by: FAMILY MEDICINE

## 2024-10-17 PROCEDURE — 87880 STREP A ASSAY W/OPTIC: CPT | Performed by: FAMILY MEDICINE

## 2024-10-17 RX ORDER — DEXAMETHASONE SODIUM PHOSPHATE 10 MG/ML
10 INJECTION INTRAMUSCULAR; INTRAVENOUS ONCE
Status: COMPLETED | OUTPATIENT
Start: 2024-10-17 | End: 2024-10-17

## 2024-10-17 RX ORDER — METHYLPREDNISOLONE ACETATE 80 MG/ML
80 INJECTION, SUSPENSION INTRA-ARTICULAR; INTRALESIONAL; INTRAMUSCULAR; SOFT TISSUE ONCE
Status: COMPLETED | OUTPATIENT
Start: 2024-10-17 | End: 2024-10-17

## 2024-10-17 RX ADMIN — METHYLPREDNISOLONE ACETATE 80 MG: 80 INJECTION, SUSPENSION INTRA-ARTICULAR; INTRALESIONAL; INTRAMUSCULAR; SOFT TISSUE at 15:57

## 2024-10-17 RX ADMIN — DEXAMETHASONE SODIUM PHOSPHATE 10 MG: 10 INJECTION INTRAMUSCULAR; INTRAVENOUS at 15:57

## 2024-10-17 NOTE — PROGRESS NOTES
Chief Complaint  Chief Complaint   Patient presents with    Insect Bite     Pt c/o insect bite x 1 week     Sore Throat     Pt c/o sore throat, itchy throat x 2 days        Subjective    History of Present Illness        Melody Guzman presents to [facility] for   History of Present Illness   History of Present Illness  The patient is a 74-year-old female who presents for evaluation of her face.    She reports persistent facial skin issues since May 2024, which she attributes to mosquito bites while gardening. The affected area occasionally splits and bleeds. She was previously prescribed doxycycline capsules and a topical cream by Dr. Eduardo, which initially improved her condition. However, in August 2024, she experienced another flare-up after cleaning her flower bed, which she believes resulted in another bite. Despite using the same cream, her symptoms worsened after an incident where dirt from her flower bed came into contact with her face. She describes her face as red and itchy, resembling sunburn, and notes that the redness has spread to her neck. She also mentions that the cream dries out her skin significantly. She receives two allergy injections weekly and wonders if this could be causing her symptoms. She noticed a slight reduction in redness this morning but is concerned about the spread of the redness to her neck. She reports no pain but admits to feeling uncomfortable. She has been using the same soap for five years without any issues and has always had sensitive skin. Her allergist, Dr. Allen, recommends Allegra for hives during the summer and fall seasons.    She also reports a sore throat, which was tested for strep and came back negative. She describes an itching sensation at the back of her throat and difficulty swallowing. She noticed one or two white spots in her throat this morning, but these were not visible upon examination. She is unsure if this is due to  "drainage.    Objective   Vital Signs:   Visit Vitals  /78   Pulse 67   Resp 17   Ht 157.5 cm (62.01\")   Wt 92.1 kg (203 lb)   LMP  (LMP Unknown)   SpO2 98%   BMI 37.12 kg/m²                Physical Exam  Vitals reviewed.   Constitutional:       Appearance: She is well-developed.   HENT:      Head: Normocephalic and atraumatic.      Nose: Nose normal.   Eyes:      Conjunctiva/sclera: Conjunctivae normal.   Cardiovascular:      Rate and Rhythm: Normal rate and regular rhythm.      Heart sounds: Normal heart sounds.   Pulmonary:      Effort: Pulmonary effort is normal.      Breath sounds: Normal breath sounds.   Abdominal:      General: Bowel sounds are normal.      Palpations: Abdomen is soft.   Musculoskeletal:         General: Normal range of motion.      Cervical back: Normal range of motion and neck supple.   Skin:     General: Skin is warm and dry.      Capillary Refill: Capillary refill takes less than 2 seconds.   Neurological:      Mental Status: She is alert and oriented to person, place, and time.   Psychiatric:         Behavior: Behavior normal.         Thought Content: Thought content normal.         Judgment: Judgment normal.        Physical Exam           Result Review :  Results  Laboratory Studies  Rapid strep test was negative.                          Assessment and Plan      Diagnoses and all orders for this visit:    1. Sore throat (Primary)  Assessment & Plan:  The sore throat does not appear to be due to strep infection, as the rapid strep test was negative. It may be related to drainage or irritation. No further treatment is necessary at this time.    Orders:  -     POC Rapid Strep A    2. Acute dermatitis  Assessment & Plan:  The rash on her face appears to be an allergic reaction rather than a fungal infection, given its dry nature and improvement with steroid cream. A steroid injection will be administered today. She is advised to continue applying triamcinolone cream twice daily and " maintain her current soap usage. Potential side effects of the steroid injection, including temporary relief and possible interference with allergy shots, were discussed.    Orders:  -     methylPREDNISolone acetate (DEPO-medrol) injection 80 mg  -     dexAMETHasone (DECADRON) injection 10 mg       Assessment & Plan             Follow Up   No follow-ups on file.  Patient was given instructions and counseling regarding her condition or for health maintenance advice. Please see specific information pulled into the AVS if appropriate.     Patient or patient representative verbalized consent for the use of Ambient Listening during the visit with  Abhi Sethi Sr, MD for chart documentation. 10/30/2024  13:24 EDT

## 2024-10-22 ENCOUNTER — TRANSCRIBE ORDERS (OUTPATIENT)
Dept: ADMINISTRATIVE | Facility: HOSPITAL | Age: 74
End: 2024-10-22
Payer: MEDICARE

## 2024-10-22 DIAGNOSIS — Z12.31 VISIT FOR SCREENING MAMMOGRAM: Primary | ICD-10-CM

## 2024-10-25 ENCOUNTER — FLU SHOT (OUTPATIENT)
Dept: FAMILY MEDICINE CLINIC | Facility: CLINIC | Age: 74
End: 2024-10-25
Payer: MEDICARE

## 2024-10-25 DIAGNOSIS — Z23 FLU VACCINE NEED: Primary | ICD-10-CM

## 2024-10-25 PROCEDURE — G0008 ADMIN INFLUENZA VIRUS VAC: HCPCS | Performed by: NURSE PRACTITIONER

## 2024-10-25 PROCEDURE — 90662 IIV NO PRSV INCREASED AG IM: CPT | Performed by: NURSE PRACTITIONER

## 2024-10-30 PROBLEM — L30.9 ACUTE DERMATITIS: Status: ACTIVE | Noted: 2024-10-30

## 2024-10-30 PROBLEM — J02.9 SORE THROAT: Status: ACTIVE | Noted: 2024-10-30

## 2024-10-30 NOTE — ASSESSMENT & PLAN NOTE
The rash on her face appears to be an allergic reaction rather than a fungal infection, given its dry nature and improvement with steroid cream. A steroid injection will be administered today. She is advised to continue applying triamcinolone cream twice daily and maintain her current soap usage. Potential side effects of the steroid injection, including temporary relief and possible interference with allergy shots, were discussed.

## 2024-10-30 NOTE — ASSESSMENT & PLAN NOTE
The sore throat does not appear to be due to strep infection, as the rapid strep test was negative. It may be related to drainage or irritation. No further treatment is necessary at this time.

## 2024-11-05 ENCOUNTER — TELEPHONE (OUTPATIENT)
Dept: FAMILY MEDICINE CLINIC | Facility: CLINIC | Age: 74
End: 2024-11-05

## 2024-11-05 NOTE — TELEPHONE ENCOUNTER
Hub staff attempted to follow warm transfer process and was unsuccessful     Caller: Melody Guzman    Relationship to patient: Self    Best call back number:[phone]      Patient is needing: SHE GOT A MESSAGE TODAY TO CALL AND CONFIRM HER APPOINTMENT ON 11/12/24 BUT IT LOOKS LIKE IT NEEDS TO BE RESCHEDULED.  CAN YOU PLEASE CALL AND CONFIRM WHICH NEEDS TO BE DONE?

## 2024-11-14 ENCOUNTER — OFFICE VISIT (OUTPATIENT)
Dept: FAMILY MEDICINE CLINIC | Facility: CLINIC | Age: 74
End: 2024-11-14
Payer: MEDICARE

## 2024-11-14 VITALS
WEIGHT: 203 LBS | BODY MASS INDEX: 37.36 KG/M2 | HEART RATE: 63 BPM | DIASTOLIC BLOOD PRESSURE: 84 MMHG | SYSTOLIC BLOOD PRESSURE: 138 MMHG | OXYGEN SATURATION: 98 % | HEIGHT: 62 IN

## 2024-11-14 DIAGNOSIS — E78.00 PURE HYPERCHOLESTEROLEMIA: Primary | ICD-10-CM

## 2024-11-14 PROCEDURE — 1126F AMNT PAIN NOTED NONE PRSNT: CPT | Performed by: NURSE PRACTITIONER

## 2024-11-14 PROCEDURE — 99213 OFFICE O/P EST LOW 20 MIN: CPT | Performed by: NURSE PRACTITIONER

## 2024-11-14 PROCEDURE — 3075F SYST BP GE 130 - 139MM HG: CPT | Performed by: NURSE PRACTITIONER

## 2024-11-14 PROCEDURE — 3079F DIAST BP 80-89 MM HG: CPT | Performed by: NURSE PRACTITIONER

## 2024-11-14 NOTE — PROGRESS NOTES
"Chief Complaint  Hypertension    Subjective        Melody Guzman presents to [facility]  Hypertension      History of Present Illness  The patient presents for evaluation of multiple medical concerns.    She reports an improvement in her overall health compared to a few weeks ago. She recalls an incident where she was pulling weeds in her backyard and accidentally ingested some dirt. This was followed by a breakout of red blotches on her face, resembling a severe sunburn. Initially, she suspected it to be hives, but the condition worsened. She also experienced throat discomfort. A strep test was conducted, which returned negative results. She received two steroid injections from Dr. Sethi, which significantly improved her condition.    She has a history of uterine cancer, diagnosed a year after her [facility]. She underwent a full hysterectomy and was under the care of Dr. Jeffrey Goldberg for five years.    She is not interested in receiving any more COVID-19 vaccines.    She has been taking Marcus Carlson's CholestCare for her cholesterol but admits to not being consistent with it.    She is scheduled for her annual mammogram on Monday at [facility].    She has a history of glaucoma and is cautious about using Tobradex due to its steroid content.    She has one refill left for her atenolol prescription.    She has an upcoming appointment on 12/20/2024 for her Medicare wellness check.    She has a history of vertigo and uses meclizine for management.    She also uses nystatin powder for yeast infections.    Objective   Vital Signs:  /84 (BP Location: Right arm, Patient Position: Sitting, Cuff Size: Large Adult)   Pulse 63   Ht 157.5 cm (62.01\")   Wt 92.1 kg (203 lb)   SpO2 98%   BMI 37.12 kg/m²   Estimated body mass index is 37.12 kg/m² as calculated from the following:    Height as of this encounter: 157.5 cm (62.01\").    Weight as of this encounter: 92.1 kg (203 " lb).               Physical Exam  Constitutional:       General: She is not in acute distress.     Appearance: She is well-developed. She is not diaphoretic.   Cardiovascular:      Rate and Rhythm: Normal rate and regular rhythm.      Heart sounds: Normal heart sounds. No murmur heard.     No friction rub. No gallop.   Pulmonary:      Effort: Pulmonary effort is normal. No respiratory distress.      Breath sounds: Normal breath sounds. No wheezing or rales.   Musculoskeletal:      Cervical back: Neck supple.   Skin:     General: Skin is warm and dry.   Neurological:      Mental Status: She is alert and oriented to person, place, and time.       Physical Exam      Result Review :          Results      Assessment & Plan  1. Elevated cholesterol.  Her cholesterol levels were notably high during the last visit. She has not been consistent with taking her cholesterol supplement, Cholestercare, as recommended. A cholesterol test and CMP will be conducted today to assess her current levels. If the cholesterol levels remain elevated, starting a cholesterol-lowering medication will be considered.    2. Medication Management.  She has one refill left on her atenolol and will continue taking it until her next visit. She also uses Tobradex sparingly for her eyes due to a past spider bite and glaucoma. She will continue using nystatin powder as needed for yeast infections.    3. Health Maintenance.  She is scheduled for her annual mammogram on Monday at [facility], [place].    Follow-up  Patient is scheduled for a follow-up visit on 12/20/2024.           Assessment and Plan     Diagnoses and all orders for this visit:    1. Pure hypercholesterolemia (Primary)  -     Lipid Panel With LDL / HDL Ratio  -     Comprehensive metabolic panel             Follow Up     No follow-ups on file.  Patient was given instructions and counseling regarding her condition or for health maintenance advice. Please see specific information pulled into  the AVS if appropriate.       Patient or patient representative verbalized consent for the use of Ambient Listening during the visit with  ZOYA Velazquez for chart documentation. 11/14/2024  14:08 EST

## 2024-11-15 LAB
ALBUMIN SERPL-MCNC: 4.4 G/DL (ref 3.8–4.8)
ALP SERPL-CCNC: 74 IU/L (ref 44–121)
ALT SERPL-CCNC: 11 IU/L (ref 0–32)
AST SERPL-CCNC: 16 IU/L (ref 0–40)
BILIRUB SERPL-MCNC: 0.7 MG/DL (ref 0–1.2)
BUN SERPL-MCNC: 15 MG/DL (ref 8–27)
BUN/CREAT SERPL: 19 (ref 12–28)
CALCIUM SERPL-MCNC: 9.7 MG/DL (ref 8.7–10.3)
CHLORIDE SERPL-SCNC: 101 MMOL/L (ref 96–106)
CHOLEST SERPL-MCNC: 316 MG/DL (ref 100–199)
CO2 SERPL-SCNC: 23 MMOL/L (ref 20–29)
CREAT SERPL-MCNC: 0.81 MG/DL (ref 0.57–1)
EGFRCR SERPLBLD CKD-EPI 2021: 77 ML/MIN/1.73
GLOBULIN SER CALC-MCNC: 3.1 G/DL (ref 1.5–4.5)
GLUCOSE SERPL-MCNC: 98 MG/DL (ref 70–99)
HDLC SERPL-MCNC: 68 MG/DL
LDL CALC COMMENT:: ABNORMAL
LDLC SERPL CALC-MCNC: 223 MG/DL (ref 0–99)
LDLC/HDLC SERPL: 3.3 RATIO (ref 0–3.2)
POTASSIUM SERPL-SCNC: 4.4 MMOL/L (ref 3.5–5.2)
PROT SERPL-MCNC: 7.5 G/DL (ref 6–8.5)
SODIUM SERPL-SCNC: 139 MMOL/L (ref 134–144)
TRIGL SERPL-MCNC: 140 MG/DL (ref 0–149)
VLDLC SERPL CALC-MCNC: 25 MG/DL (ref 5–40)

## 2024-11-18 ENCOUNTER — HOSPITAL ENCOUNTER (OUTPATIENT)
Dept: MAMMOGRAPHY | Facility: HOSPITAL | Age: 74
Discharge: HOME | End: 2024-11-18
Admitting: NURSE PRACTITIONER
Payer: MEDICARE

## 2024-11-18 DIAGNOSIS — Z12.31 VISIT FOR SCREENING MAMMOGRAM: ICD-10-CM

## 2024-11-18 PROCEDURE — 77063 BREAST TOMOSYNTHESIS BI: CPT

## 2024-11-18 PROCEDURE — 77067 SCR MAMMO BI INCL CAD: CPT

## 2024-12-20 ENCOUNTER — OFFICE VISIT (OUTPATIENT)
Dept: FAMILY MEDICINE CLINIC | Facility: CLINIC | Age: 74
End: 2024-12-20
Payer: MEDICARE

## 2024-12-20 VITALS
SYSTOLIC BLOOD PRESSURE: 156 MMHG | DIASTOLIC BLOOD PRESSURE: 84 MMHG | HEART RATE: 58 BPM | WEIGHT: 204.5 LBS | BODY MASS INDEX: 37.63 KG/M2 | HEIGHT: 62 IN | OXYGEN SATURATION: 97 %

## 2024-12-20 DIAGNOSIS — H10.13 ALLERGIC CONJUNCTIVITIS OF BOTH EYES: ICD-10-CM

## 2024-12-20 DIAGNOSIS — I10 ESSENTIAL HYPERTENSION: ICD-10-CM

## 2024-12-20 DIAGNOSIS — R42 DIZZINESS: ICD-10-CM

## 2024-12-20 DIAGNOSIS — L30.8 OTHER ECZEMA: ICD-10-CM

## 2024-12-20 DIAGNOSIS — L30.4 INTERTRIGO: ICD-10-CM

## 2024-12-20 PROCEDURE — 1126F AMNT PAIN NOTED NONE PRSNT: CPT | Performed by: NURSE PRACTITIONER

## 2024-12-20 PROCEDURE — 3079F DIAST BP 80-89 MM HG: CPT | Performed by: NURSE PRACTITIONER

## 2024-12-20 PROCEDURE — 3077F SYST BP >= 140 MM HG: CPT | Performed by: NURSE PRACTITIONER

## 2024-12-20 PROCEDURE — G0439 PPPS, SUBSEQ VISIT: HCPCS | Performed by: NURSE PRACTITIONER

## 2024-12-20 PROCEDURE — 1170F FXNL STATUS ASSESSED: CPT | Performed by: NURSE PRACTITIONER

## 2024-12-20 RX ORDER — TRIAMCINOLONE ACETONIDE 0.25 MG/G
CREAM TOPICAL 3 TIMES DAILY
Qty: 30 G | Refills: 3 | Status: SHIPPED | OUTPATIENT
Start: 2024-12-20

## 2024-12-20 RX ORDER — DOXYCYCLINE 100 MG/1
100 CAPSULE ORAL 2 TIMES DAILY
Qty: 14 CAPSULE | Refills: 0 | Status: SHIPPED | OUTPATIENT
Start: 2024-12-20

## 2024-12-20 RX ORDER — OLOPATADINE HYDROCHLORIDE 1 MG/ML
1 SOLUTION/ DROPS OPHTHALMIC 2 TIMES DAILY
Qty: 5 ML | Refills: 11 | Status: SHIPPED | OUTPATIENT
Start: 2024-12-20

## 2024-12-20 RX ORDER — FLUCONAZOLE 150 MG/1
150 TABLET ORAL
Qty: 3 TABLET | Refills: 2 | Status: SHIPPED | OUTPATIENT
Start: 2024-12-20

## 2024-12-20 RX ORDER — ATENOLOL 50 MG/1
50 TABLET ORAL DAILY
Qty: 30 TABLET | Refills: 11 | Status: SHIPPED | OUTPATIENT
Start: 2024-12-20

## 2024-12-20 RX ORDER — MECLIZINE HYDROCHLORIDE 25 MG/1
25 TABLET ORAL 3 TIMES DAILY PRN
Qty: 60 TABLET | Refills: 5 | Status: SHIPPED | OUTPATIENT
Start: 2024-12-20

## 2024-12-20 NOTE — PROGRESS NOTES
Subjective   The ABCs of the Annual Wellness Visit  Medicare Wellness Visit      Melody Guzman is a 74 y.o. patient who presents for a Medicare Wellness Visit.    The following portions of the patient's history were reviewed and   updated as appropriate: allergies, current medications, past family history, past medical history, past social history, past surgical history, and problem list.    Compared to one year ago, the patient's physical   health is the same.  Compared to one year ago, the patient's mental   health is the same.    Recent Hospitalizations:  She was not admitted to the hospital during the last year.     Current Medical Providers:  Patient Care Team:  Alesha Cherry APRN as PCP - General (Family Medicine)    Outpatient Medications Prior to Visit   Medication Sig Dispense Refill   • atenolol (TENORMIN) 50 MG tablet Take 1 tablet by mouth Daily. 90 tablet 3   • fluconazole (DIFLUCAN) 150 MG tablet Take 1 tablet by mouth Every 3 (Three) Days. (Patient not taking: Reported on 8/16/2024) 3 tablet 2   • indomethacin (INDOCIN) 50 MG capsule Take 1 capsule by mouth 3 (Three) Times a Day With Meals. 15 capsule 0   • ketoconazole (NIZORAL) 2 % cream by Intratympanic route.     • meclizine (ANTIVERT) 25 MG tablet Take 1 tablet by mouth 3 (Three) Times a Day As Needed for Dizziness. 60 tablet 5   • mupirocin (BACTROBAN) 2 % ointment Apply 1 Application topically to the appropriate area as directed 3 (Three) Times a Day. 22 g 0   • nystatin 480638 UNIT/GM powder Apply  topically to the appropriate area as directed 2 (Two) Times a Day. 60 g 3   • nystatin-triamcinolone (MYCOLOG) 697365-7.1 UNIT/GM-% ointment Apply 1 application  topically to the appropriate area as directed 2 (Two) Times a Day. 60 g 2   • olopatadine (PATANOL) 0.1 % ophthalmic solution Administer 1 drop to both eyes 2 (Two) Times a Day. 5 mL 11   • TobraDex 0.3-0.1 % ophthalmic ointment Apply topically twice daily to both upper lids.  "    • triamcinolone (KENALOG) 0.025 % cream Apply  topically to the appropriate area as directed 3 (Three) Times a Day. 30 g 3     No facility-administered medications prior to visit.     No opioid medication identified on active medication list. I have reviewed chart for other potential  high risk medication/s and harmful drug interactions in the elderly.      Aspirin is not on active medication list.  Aspirin use is not indicated based on review of current medical condition/s. Risk of harm outweighs potential benefits.  .    Patient Active Problem List   Diagnosis   • Atopic rhinitis   • Gout   • Hypertension   • Other specified noninflammatory disorders of vulva and perineum   • Malignant neoplasm of endometrium   • Morbid (severe) obesity due to excess calories   • Vertigo   • Allergic conjunctivitis of both eyes   • Pure hypercholesterolemia   • Right knee meniscal tear   • Sore throat   • Acute dermatitis     Advance Care Planning Advance Directive is not on file.  ACP discussion was held with the patient during this visit. Patient does not have an advance directive, declines further assistance.            Objective   There were no vitals filed for this visit.    Estimated body mass index is 37.12 kg/m² as calculated from the following:    Height as of 11/14/24: 157.5 cm (62.01\").    Weight as of 11/14/24: 92.1 kg (203 lb).    Class 2 Severe Obesity (BMI >=35 and <=39.9). Obesity-related health conditions include the following: hypertension. Obesity is newly identified. BMI is is above average; BMI management plan is completed. We discussed portion control and increasing exercise.           Does the patient have evidence of cognitive impairment? No  Lab Results   Component Value Date    CHLPL 316 (H) 11/14/2024    TRIG 140 11/14/2024    HDL 68 11/14/2024     (H) 11/14/2024    VLDL 25 11/14/2024         Physical Exam  Vitals reviewed.   Constitutional:       General: She is not in acute distress.     " Appearance: She is well-developed. She is not diaphoretic.   HENT:      Head: Normocephalic and atraumatic.      Right Ear: Tympanic membrane, ear canal and external ear normal.      Left Ear: Tympanic membrane, ear canal and external ear normal.      Nose: Nose normal.      Mouth/Throat:      Mouth: Mucous membranes are moist.      Pharynx: Oropharynx is clear. Uvula midline. No oropharyngeal exudate.   Eyes:      Conjunctiva/sclera: Conjunctivae normal.      Pupils: Pupils are equal, round, and reactive to light.   Cardiovascular:      Rate and Rhythm: Normal rate and regular rhythm.      Heart sounds: Normal heart sounds. No murmur heard.     No friction rub. No gallop.   Pulmonary:      Effort: Pulmonary effort is normal. No respiratory distress.      Breath sounds: Normal breath sounds. No wheezing or rales.   Abdominal:      General: Bowel sounds are normal. There is no distension.      Palpations: Abdomen is soft.      Tenderness: There is no abdominal tenderness.   Musculoskeletal:      Cervical back: Neck supple.   Lymphadenopathy:      Cervical: No cervical adenopathy.   Skin:     General: Skin is warm and dry.   Neurological:      Mental Status: She is alert and oriented to person, place, and time.   Psychiatric:         Mood and Affect: Mood normal.                                                                                           Health  Risk Assessment    Smoking Status:  Social History     Tobacco Use   Smoking Status Never   Smokeless Tobacco Never     Alcohol Consumption:  Social History     Substance and Sexual Activity   Alcohol Use No       Fall Risk Screen  STEADI Fall Risk Assessment has not been completed.    Depression Screening   The PHQ has not been completed during this encounter.     Health Habits and Functional and Cognitive Screenin/19/2023    10:27 AM   Functional & Cognitive Status   Do you have difficulty preparing food and eating? No   Do you have difficulty bathing  yourself, getting dressed or grooming yourself? No   Do you have difficulty using the toilet? No   Do you have difficulty moving around from place to place? No   Do you have trouble with steps or getting out of a bed or a chair? No   Current Diet Well Balanced Diet   Dental Exam Up to date   Eye Exam Up to date   Exercise (times per week) 7 times per week   Current Exercises Include Aerobics   Do you need help using the phone?  No   Are you deaf or do you have serious difficulty hearing?  No   Do you need help to go to places out of walking distance? No   Do you need help shopping? No   Do you need help preparing meals?  No   Do you need help with housework?  No   Do you need help with laundry? No   Do you need help taking your medications? No   Do you need help managing money? No   Do you ever drive or ride in a car without wearing a seat belt? No   Have you felt unusual stress, anger or loneliness in the last month? No   Who do you live with? Alone   If you need help, do you have trouble finding someone available to you? No   Have you been bothered in the last four weeks by sexual problems? No   Do you have difficulty concentrating, remembering or making decisions? No           Age-appropriate Screening Schedule:  Refer to the list below for future screening recommendations based on patient's age, sex and/or medical conditions. Orders for these recommended tests are listed in the plan section. The patient has been provided with a written plan.    Health Maintenance List  Health Maintenance   Topic Date Due   • ZOSTER VACCINE (2 of 3) 12/10/2013   • ANNUAL WELLNESS VISIT  12/19/2024   • BMI FOLLOWUP  12/19/2024   • DXA SCAN  07/06/2025   • TDAP/TD VACCINES (4 - Td or Tdap) 08/10/2025   • LIPID PANEL  11/14/2025   • PAP SMEAR  01/10/2026   • MAMMOGRAM  11/18/2026   • COLORECTAL CANCER SCREENING  06/05/2028   • HEPATITIS C SCREENING  Completed   • INFLUENZA VACCINE  Completed   • Pneumococcal Vaccine 65+  Completed    • COVID-19 Vaccine  Discontinued                                                                                                                                                CMS Preventative Services Quick Reference  Risk Factors Identified During Encounter  Mixed hyperlipidemia: declines statin therapy, will take cholestacare for next six months    The above risks/problems have been discussed with the patient.  Pertinent information has been shared with the patient in the After Visit Summary.  An After Visit Summary and PPPS were made available to the patient.    Follow Up:   Next Medicare Wellness visit to be scheduled in 1 year.     Assessment & Plan  Essential hypertension      Orders:  •  atenolol (TENORMIN) 50 MG tablet; Take 1 tablet by mouth Daily.    Dizziness    Orders:  •  meclizine (ANTIVERT) 25 MG tablet; Take 1 tablet by mouth 3 (Three) Times a Day As Needed for Dizziness.    Allergic conjunctivitis of both eyes    Orders:  •  olopatadine (PATANOL) 0.1 % ophthalmic solution; Administer 1 drop to both eyes 2 (Two) Times a Day.    Intertrigo    Orders:  •  fluconazole (DIFLUCAN) 150 MG tablet; Take 1 tablet by mouth Every 3 (Three) Days.    Other eczema    Orders:  •  triamcinolone (KENALOG) 0.025 % cream; Apply  topically to the appropriate area as directed 3 (Three) Times a Day.         Follow Up:   No follow-ups on file.

## 2024-12-20 NOTE — ASSESSMENT & PLAN NOTE
Orders:    olopatadine (PATANOL) 0.1 % ophthalmic solution; Administer 1 drop to both eyes 2 (Two) Times a Day.

## 2025-02-07 ENCOUNTER — NURSE TRIAGE (OUTPATIENT)
Dept: CALL CENTER | Facility: HOSPITAL | Age: 75
End: 2025-02-07
Payer: MEDICARE

## 2025-02-07 NOTE — TELEPHONE ENCOUNTER
[facility]  Allergic reaction  Unable to reach office.    Back in Oct treated for skin issue injections, rash resolved    This seems like the same rash, she had in Oct    Slipped and fell in ice snow slush last week, snow and slush and mud splattered onto her face  Left side of face rash     Takes allergy injections 2x week  Takes Allegra daily    Care advice given Advised if appointment not available in 2-3 days, UC maybe an option    Warm transfer to [facility] redirect for appointment 2-3 days. Spoke with Cristal for appointment  Reason for Disposition   Localized rash present > 7 days    Additional Information   Negative: [1] Sudden onset of rash (within last 2 hours) AND [2] difficulty breathing or swallowing   Negative: Sounds like a life-threatening emergency to the triager   Negative: Athlete's Foot suspected (i.e., itchy rash between the toes)   Negative: Impetigo suspected (i.e., painless infected superficial small sores, less than 1 inch or 2.5 cm, often covered by a soft, yellow-brown scab or crust; sometimes occurring near nasal openings)   Negative: Insect bite(s) suspected   Negative: Jock Itch suspected (i.e., itchy rash on inner thighs near genital area)   Negative: Localized lump (or swelling) without redness or rash   Negative: [1] Mpox suspected (e.g., direct skin contact such as sex, recent travel to [place] or [place]) AND [2] symptoms of Mpox (e.g., rash, fever, muscle aches, or swollen lymph nodes)   Negative: [1] At risk for Mpox (men-who-have-sex-with-men) AND [2] possible exposure (e.g., multiple sex partners in past 21 days) AND [3] symptoms of Mpox (e.g., rash, fever, muscle aches, or swollen lymph nodes)   Negative: Poison ivy, oak, or sumac rash suspected (e.g., itchy rash after contact with poison ivy)   Negative: Rash of female genital area  (e.g., labia, vagina, vulva)   Negative: Rash of male genital area (e.g., penis, scrotum)   Negative: Redness of immunization site   Negative: Ringworm  "suspected (i.e., round pink patch, sometimes looks like ring, usually 1/2 to 1 inch [12-25 mm],  in size, slowly increasing in size)   Negative: Shingles suspected (i.e., painful rash, multiple small blisters grouped together in one area of body; dermatomal distribution)   Negative: Small spot, skin growth, or mole   Negative: Sores or skin ulcer, not a rash   Negative: Wound infection suspected (i.e., pain, spreading redness, or pus; in a cut, puncture, scrape or sutured wound)   Negative: [1] Localized purple or blood-colored spots or dots AND [2] not from injury or friction AND [3] fever   Negative: Patient sounds very sick or weak to the triager   Negative: [1] Red area or streak AND [2] fever   Negative: [1] Rash is painful to touch AND [2] fever   Negative: [1] Looks infected (spreading redness, pus) AND [2] large red area (> 2 in. or 5 cm)   Negative: [1] Looks infected (spreading redness, pus) AND [2] diabetes mellitus or weak immune system (e.g., HIV positive, cancer chemo, splenectomy, organ transplant, chronic steroids)   Negative: [1] Localized purple or blood-colored spots or dots AND [2] not from injury or friction AND [3] no fever   Negative: [1] Looks infected (spreading redness, pus) AND [2] no fever   Negative: Looks like a boil, infected sore, deep ulcer or other infected rash   Negative: [1] Localized rash is very painful AND [2] no fever   Negative: Genital area rash   Negative: Lyme disease suspected (e.g., bull's eye rash or tick bite / exposure)   Negative: [1] Applying cream or ointment AND [2] causes severe itch, burning or pain   Negative: Medication patch causing local rash or itching   Negative: [1] Pimples (localized) AND [2 ] no improvement after using Care Advice   Negative: Tender bumps in armpits   Negative: [1] Severe localized itching AND [2] after 2 days of steroid cream    Answer Assessment - Initial Assessment Questions  1. APPEARANCE of RASH: \"Describe the rash.\"       Red " "raised  2. LOCATION: \"Where is the rash located?\"       Left side of face  3. NUMBER: \"How many spots are there?\"       Not dots larger red areas  4. SIZE: \"How big are the spots?\" (Inches, centimeters or compare to size of a coin)       *No Answer*  5. ONSET: \"When did the rash start?\"       Tuesday or Wednesday  6. ITCHING: \"Does the rash itch?\" If Yes, ask: \"How bad is the itch?\"  (Scale 0-10; or none, mild, moderate, severe)      Occasionally itches  7. PAIN: \"Does the rash hurt?\" If Yes, ask: \"How bad is the pain?\"  (Scale 0-10; or none, mild, moderate, severe)     - NONE (0): no pain     - MILD (1-3): doesn't interfere with normal activities      - MODERATE (4-7): interferes with normal activities or awakens from sleep      - SEVERE (8-10): excruciating pain, unable to do any normal activities      No pain  8. OTHER SYMPTOMS: \"Do you have any other symptoms?\" (e.g., fever)      *No Answer*deniues  9. PREGNANCY: \"Is there any chance you are pregnant?\" \"When was your last menstrual period?\"      *No Answer*    Protocols used: Rash or Redness - Localized-ADULT-AH    "

## 2025-02-13 ENCOUNTER — OFFICE VISIT (OUTPATIENT)
Dept: FAMILY MEDICINE CLINIC | Facility: CLINIC | Age: 75
End: 2025-02-13
Payer: MEDICARE

## 2025-02-13 VITALS
HEART RATE: 59 BPM | OXYGEN SATURATION: 97 % | SYSTOLIC BLOOD PRESSURE: 148 MMHG | WEIGHT: 204 LBS | BODY MASS INDEX: 37.54 KG/M2 | DIASTOLIC BLOOD PRESSURE: 88 MMHG | HEIGHT: 62 IN

## 2025-02-13 DIAGNOSIS — L20.9 ATOPIC DERMATITIS, UNSPECIFIED TYPE: Primary | ICD-10-CM

## 2025-02-13 PROCEDURE — 99213 OFFICE O/P EST LOW 20 MIN: CPT | Performed by: NURSE PRACTITIONER

## 2025-02-13 PROCEDURE — 3077F SYST BP >= 140 MM HG: CPT | Performed by: NURSE PRACTITIONER

## 2025-02-13 PROCEDURE — 1126F AMNT PAIN NOTED NONE PRSNT: CPT | Performed by: NURSE PRACTITIONER

## 2025-02-13 PROCEDURE — 96372 THER/PROPH/DIAG INJ SC/IM: CPT | Performed by: NURSE PRACTITIONER

## 2025-02-13 PROCEDURE — 3079F DIAST BP 80-89 MM HG: CPT | Performed by: NURSE PRACTITIONER

## 2025-02-13 RX ORDER — DEXAMETHASONE SODIUM PHOSPHATE 10 MG/ML
10 INJECTION INTRAMUSCULAR; INTRAVENOUS ONCE
Status: DISCONTINUED | OUTPATIENT
Start: 2025-02-13 | End: 2025-02-13

## 2025-02-13 RX ORDER — HYDROXYZINE HYDROCHLORIDE 25 MG/1
25 TABLET, FILM COATED ORAL 3 TIMES DAILY PRN
Qty: 30 TABLET | Refills: 0 | Status: SHIPPED | OUTPATIENT
Start: 2025-02-13

## 2025-02-13 RX ORDER — METHYLPREDNISOLONE ACETATE 80 MG/ML
80 INJECTION, SUSPENSION INTRA-ARTICULAR; INTRALESIONAL; INTRAMUSCULAR; SOFT TISSUE ONCE
Status: COMPLETED | OUTPATIENT
Start: 2025-02-13 | End: 2025-02-13

## 2025-02-13 RX ORDER — DEXAMETHASONE SODIUM PHOSPHATE 10 MG/ML
10 INJECTION INTRAMUSCULAR; INTRAVENOUS ONCE
Status: COMPLETED | OUTPATIENT
Start: 2025-02-13 | End: 2025-02-13

## 2025-02-13 RX ADMIN — METHYLPREDNISOLONE ACETATE 80 MG: 80 INJECTION, SUSPENSION INTRA-ARTICULAR; INTRALESIONAL; INTRAMUSCULAR; SOFT TISSUE at 11:44

## 2025-02-13 RX ADMIN — DEXAMETHASONE SODIUM PHOSPHATE 10 MG: 10 INJECTION INTRAMUSCULAR; INTRAVENOUS at 12:56

## 2025-02-13 NOTE — PROGRESS NOTES
"Chief Complaint  Rash    Subjective        Melody Guzman presents to [facility]  Rash      History of Present Illness  The patient presents for evaluation of facial dermatitis.    She recounts an incident in January 2025 where she sustained a fall on her face due to slippery conditions caused by snow. Despite initial redness, she did not observe any bruising on her face but noted bruises on her knees and shoulder discomfort that persisted for several weeks. She attributes the facial redness to her glasses and maintained facial hygiene by washing and keeping it dry. However, the condition did not improve, and she noticed a rash developing on her face, accompanied by itching in her eyes.  She previously received IM steroid injection with near resolution of symptoms.    ALLERGIES  The patient is highly allergic to CATS.    MEDICATIONS  Current: Allegra  Past: Tobradex eyegel    Objective   Vital Signs:  /88 (BP Location: Right arm, Patient Position: Sitting, Cuff Size: Large Adult)   Pulse 59   Ht 157.5 cm (62.01\")   Wt 92.5 kg (204 lb)   SpO2 97%   BMI 37.30 kg/m²   Estimated body mass index is 37.3 kg/m² as calculated from the following:    Height as of this encounter: 157.5 cm (62.01\").    Weight as of this encounter: 92.5 kg (204 lb).               Physical Exam  Constitutional:       General: She is not in acute distress.     Appearance: She is well-developed. She is not diaphoretic.   Cardiovascular:      Rate and Rhythm: Normal rate and regular rhythm.      Heart sounds: Normal heart sounds. No murmur heard.     No friction rub. No gallop.   Pulmonary:      Effort: Pulmonary effort is normal. No respiratory distress.      Breath sounds: Normal breath sounds. No wheezing or rales.   Musculoskeletal:      Cervical back: Neck supple.   Skin:     General: Skin is warm and dry.      Comments: Bilateral periorbital dryness, mild scaling and erythema, no warmth, left cheek " minimal erythema, chin with erythema and dryness   Neurological:      Mental Status: She is alert and oriented to person, place, and time.       Physical Exam      Result Review :          Results      Assessment & Plan  1. Facial dermatitis.  Recommend eucrisa topically to treat periorbital dermatitis, patient would like to proceed with injection and if no improvement proceed to cream, will give injection today. Ok to take hydroxyzine prn.               Assessment and Plan     Diagnoses and all orders for this visit:    1. Atopic dermatitis, unspecified type (Primary)  -     Discontinue: dexAMETHasone (DECADRON) injection 10 mg  -     methylPREDNISolone acetate (DEPO-medrol) injection 80 mg  -     dexAMETHasone (DECADRON) injection 10 mg    Other orders  -     hydrOXYzine (ATARAX) 25 MG tablet; Take 1 tablet by mouth 3 (Three) Times a Day As Needed for Itching.  Dispense: 30 tablet; Refill: 0             Follow Up     No follow-ups on file.  Patient was given instructions and counseling regarding her condition or for health maintenance advice. Please see specific information pulled into the AVS if appropriate.       Patient or patient representative verbalized consent for the use of Ambient Listening during the visit with  ZOYA Velazquez for chart documentation. 2/13/2025  17:54 EST

## 2025-02-28 ENCOUNTER — OFFICE VISIT (OUTPATIENT)
Dept: FAMILY MEDICINE CLINIC | Facility: CLINIC | Age: 75
End: 2025-02-28
Payer: MEDICARE

## 2025-02-28 ENCOUNTER — TELEPHONE (OUTPATIENT)
Dept: FAMILY MEDICINE CLINIC | Facility: CLINIC | Age: 75
End: 2025-02-28

## 2025-02-28 VITALS
BODY MASS INDEX: 37.54 KG/M2 | SYSTOLIC BLOOD PRESSURE: 146 MMHG | DIASTOLIC BLOOD PRESSURE: 86 MMHG | HEART RATE: 59 BPM | WEIGHT: 204 LBS | OXYGEN SATURATION: 97 % | HEIGHT: 62 IN

## 2025-02-28 DIAGNOSIS — H01.135 ECZEMATOUS DERMATITIS OF UPPER AND LOWER EYELIDS OF BOTH EYES: Primary | ICD-10-CM

## 2025-02-28 DIAGNOSIS — H01.132 ECZEMATOUS DERMATITIS OF UPPER AND LOWER EYELIDS OF BOTH EYES: Primary | ICD-10-CM

## 2025-02-28 DIAGNOSIS — H01.134 ECZEMATOUS DERMATITIS OF UPPER AND LOWER EYELIDS OF BOTH EYES: Primary | ICD-10-CM

## 2025-02-28 DIAGNOSIS — H01.131 ECZEMATOUS DERMATITIS OF UPPER AND LOWER EYELIDS OF BOTH EYES: Primary | ICD-10-CM

## 2025-02-28 PROCEDURE — 3079F DIAST BP 80-89 MM HG: CPT | Performed by: NURSE PRACTITIONER

## 2025-02-28 PROCEDURE — 1126F AMNT PAIN NOTED NONE PRSNT: CPT | Performed by: NURSE PRACTITIONER

## 2025-02-28 PROCEDURE — 3077F SYST BP >= 140 MM HG: CPT | Performed by: NURSE PRACTITIONER

## 2025-02-28 PROCEDURE — 99214 OFFICE O/P EST MOD 30 MIN: CPT | Performed by: NURSE PRACTITIONER

## 2025-02-28 RX ORDER — CRISABOROLE 20 MG/G
1 OINTMENT TOPICAL 2 TIMES DAILY
Qty: 100 G | Refills: 1 | Status: SHIPPED | OUTPATIENT
Start: 2025-02-28

## 2025-03-02 NOTE — PROGRESS NOTES
"Chief Complaint  Rash    Subjective        Melody Guzman presents to [facility]  Rash      History of Present Illness  The patient presents for evaluation of a rash.    She reports a significant improvement in her condition following the administration of a steroid injection. Initially, she experienced persistent redness, but this subsided within a few days. She also noted a smoothing effect on her skin, which was previously rough and cracked, particularly around her eyelids. She missed her allergy injection that week but did not experience any adverse effects. However, after receiving her allergy injection the following week, she noticed a prickly sensation around the rash site, which was accompanied by mild redness. She discontinued her allergy injections and Allegra 180 mg, which she had been taking as needed due to sensitivity to various substances. She also stopped taking Zyrtec 10 mg at night due to gastrointestinal upset and headaches.   She has been experiencing hair loss, which she attributes to either her allergy injections or steroids. She has a history of oily skin and scalp, which improved after menopause. She has previously taken probiotics but discontinued them a few years ago.    She has been experiencing itching in the back of her throat and watery eyes for the past few months, which she attributes to her allergies. She has an appointment with Dr. Bryan in 05/2025 for her annual check-up.    ALLERGIES  The patient is sensitive to PINK DYE.    MEDICATIONS  Discontinued: Allegra, Zyrtec    Objective   Vital Signs:  /86 (BP Location: Right arm, Patient Position: Sitting, Cuff Size: Large Adult)   Pulse 59   Ht 157.5 cm (62.01\")   Wt 92.5 kg (204 lb)   SpO2 97%   BMI 37.30 kg/m²   Estimated body mass index is 37.3 kg/m² as calculated from the following:    Height as of this encounter: 157.5 cm (62.01\").    Weight as of this encounter: 92.5 kg (204 lb).         "       Physical Exam  Skin:     Findings: Erythema (dry scaly rash around bilateral upper and lower lids, buccal area and chin) present.       Physical Exam      Result Review :          Results      Assessment & Plan  1. Rash.  The rash has shown significant improvement following the administration of a long-acting injection. The patient reported that the redness and roughness have diminished, and the skin feels smoother. A prescription for Eucrisa 100 g has been issued to be applied topically on the affected areas, including around the eyes and chin. She has been advised to use a small amount of the ointment and to discontinue use if it causes burning that is intolerable. If the burning is tolerable, she may continue using it. She has also been instructed to rinse her skin with cool water and Jyoti dish soap after outdoor activities to remove plant oils and prevent rashes.    2. Hair loss.  She has been advised to take hair, skin, and nail vitamins containing B vitamins, biotin, and B12 to potentially aid in hair growth. She should look for a formulation that does not contain pink dye, as she is sensitive to it.    3. Allergies.  She has been advised to take Pepcid AC for allergies, which can be used in conjunction with Zyrtec at night. This combination targets different mechanisms and may help manage her symptoms more effectively. She should also avoid taking Allegra 180 mg daily as it may be contributing to her symptoms.           Assessment and Plan     Diagnoses and all orders for this visit:    1. Eczematous dermatitis of upper and lower eyelids of both eyes (Primary)    Other orders  -     Crisaborole (Eucrisa) 2 % ointment; Apply 1 Application topically 2 (Two) Times a Day. To face  Dispense: 100 g; Refill: 1             Follow Up     No follow-ups on file.  Patient was given instructions and counseling regarding her condition or for health maintenance advice. Please see specific information pulled into the  AVS if appropriate.       Patient or patient representative verbalized consent for the use of Ambient Listening during the visit with  ZOYA Velazquez for chart documentation. 3/2/2025  15:20 EST

## 2025-03-07 ENCOUNTER — TELEPHONE (OUTPATIENT)
Dept: FAMILY MEDICINE CLINIC | Facility: CLINIC | Age: 75
End: 2025-03-07

## 2025-03-07 NOTE — TELEPHONE ENCOUNTER
Attempted to call pt. No answer LM     HUB TO RELAY    She could try a low dose otc steroid cream.  If she uses that it will have to be a very small amount and be careful not to put it in her eyes. Please let us know if this is what you would like to do

## 2025-03-07 NOTE — TELEPHONE ENCOUNTER
Caller: Melody Guzman    Relationship: Self    Best call back number: [phone]    What is the best time to reach you: ANY    Who are you requesting to speak with (clinical staff, provider,  specific staff member): CLINICAL    Do you know the name of the person who called: N/A    What was the call regarding: PATIENT IS REQUESTING CALLBACK TO DISCUSS EUCRISA. PRIOR AUTH WENT THROUGH, BUT PATIENT STILL HAS TO PAY $200, ASKING IF A SMALLER TUBE OR DOSAGE CAN BE CALLED IN TO TRY AND LOWER HER COST. OKAY TO LEAVE VM IF SHE DOESN'T ANSWER    Is it okay if the provider responds through MyChart: NO

## (undated) DEVICE — THE TORRENT IRRIGATION SCOPE CONNECTOR IS USED WITH THE TORRENT IRRIGATION TUBING TO PROVIDE IRRIGATION FLUIDS SUCH AS STERILE WATER DURING GASTROINTESTINAL ENDOSCOPIC PROCEDURES WHEN USED IN CONJUNCTION WITH AN IRRIGATION PUMP (OR ELECTROSURGICAL UNIT).: Brand: TORRENT

## (undated) DEVICE — CANN NASL CO2 TRULINK W/O2 A/

## (undated) DEVICE — SINGLE-USE BIOPSY FORCEPS: Brand: RADIAL JAW 4

## (undated) DEVICE — Device: Brand: DEFENDO AIR/WATER/SUCTION AND BIOPSY VALVE

## (undated) DEVICE — TUBING, SUCTION, 1/4" X 10', STRAIGHT: Brand: MEDLINE